# Patient Record
Sex: FEMALE | Race: BLACK OR AFRICAN AMERICAN | NOT HISPANIC OR LATINO | Employment: FULL TIME | URBAN - METROPOLITAN AREA
[De-identification: names, ages, dates, MRNs, and addresses within clinical notes are randomized per-mention and may not be internally consistent; named-entity substitution may affect disease eponyms.]

---

## 2019-06-18 ENCOUNTER — PREP FOR SURGERY (OUTPATIENT)
Dept: OTHER | Facility: HOSPITAL | Age: 69
End: 2019-06-18

## 2019-09-09 ENCOUNTER — TELEPHONE (OUTPATIENT)
Dept: CARDIOLOGY | Facility: CLINIC | Age: 69
End: 2019-09-09

## 2019-09-09 NOTE — TELEPHONE ENCOUNTER
I called and s/w pt and offered her an appointment to see Dr. Kelly on 9/11/19 at 3:00pm.    Maria Luz, can you please schedule Ms. Cooper for 9/11/19 at 3:oopm?  She will be a NP.  Thank you/lalit

## 2019-09-09 NOTE — TELEPHONE ENCOUNTER
Patient was admitted 9/6 at [facility] with near syncope and HR 40s. She was diagnosed with vertigo and is to see ENT this week. She was also told to follow up with her cardiologist but she used to se Dr Sheth who is no longer in practice. She would like to be established at [facility].     I spoke with Dr. Kelly, who told me to tell you Amber.     She has Hx of coronary stent, old CVA, smoker. We would need some old records.    Please call patient cell [phone]/  [phone] to arrange new patient appt with MI.

## 2019-09-11 ENCOUNTER — OFFICE VISIT (OUTPATIENT)
Dept: CARDIOLOGY | Facility: CLINIC | Age: 69
End: 2019-09-11

## 2019-09-11 VITALS
DIASTOLIC BLOOD PRESSURE: 78 MMHG | SYSTOLIC BLOOD PRESSURE: 100 MMHG | WEIGHT: 150 LBS | BODY MASS INDEX: 25.61 KG/M2 | HEART RATE: 61 BPM | HEIGHT: 64 IN

## 2019-09-11 DIAGNOSIS — I25.10 CORONARY ARTERY DISEASE INVOLVING NATIVE CORONARY ARTERY OF NATIVE HEART WITHOUT ANGINA PECTORIS: Primary | ICD-10-CM

## 2019-09-11 DIAGNOSIS — I10 ESSENTIAL HYPERTENSION: ICD-10-CM

## 2019-09-11 DIAGNOSIS — E78.2 MIXED HYPERLIPIDEMIA: ICD-10-CM

## 2019-09-11 DIAGNOSIS — R42 VERTIGO: ICD-10-CM

## 2019-09-11 PROCEDURE — 99204 OFFICE O/P NEW MOD 45 MIN: CPT | Performed by: INTERNAL MEDICINE

## 2019-09-11 PROCEDURE — 93000 ELECTROCARDIOGRAM COMPLETE: CPT | Performed by: INTERNAL MEDICINE

## 2019-09-11 NOTE — PROGRESS NOTES
Date of Office Visit: 19  Encounter Provider: Roc Kelly MD  Place of Service: [facility] CARDIOLOGY  Patient Name: Jenn Cooper  :1950  Referral Provider:No ref. provider found      No chief complaint on file.    History of Present Illness  Mrs Cooper is a pleasant 69 yo female with a prior history of hypertension, previous supravascular accident, kidney stones, sleep apnea, coronary artery disease.  She had anginal symptoms and in 2006 had cardiac catheterization showed 70% stenosis of the mid left anterior descending other vessels free of disease she had a 2.75 x 18 mm Cypher drug-eluting stent placed.  And in  had follow-up catheterization that was unremarkable.  And then she also had problems with syncopal near syncopal spells had an episode and at that time had a near syncopal spell but had a tilt table test in .  That was apparently positive and was on acebutolol for a short period of time.  She then started having more episodes in the past 3 months she would note him with position changes.  In particular she was getting her hair washed leaned her head back started getting dizzy lightheaded diaphoretic the room was spinning lasted 15 to 20 minutes she thought she got better had felt like she had to go to the bathroom got up to the bathroom got dizzy lightheaded again and then apparently passed out.  No paralysis or paresthesia or dysarthria.  No chest pain or pressure.  No real shortness of breath orthopnea or PND.  The other episode she is had of dizziness in the last 2 months have been very similar to those.  She did go to the emergency room twice was told she had vertigo.  Was also ever told that her heart rate was in the 40s.          Past Medical History:   Diagnosis Date   • Contraindication to deep vein thrombosis (DVT) prophylaxis    • HTN (hypertension)    • Hyperlipidemia    • Hypothyroid    • Lightheadedness    • Sinus bradycardia     • Syncope    • Vertigo          History reviewed. No pertinent surgical history.      Current Outpatient Medications on File Prior to Visit   Medication Sig Dispense Refill   • aspirin 325 MG EC tablet TAKE 1 TABLET BY MOUTH DAILY FOR 90 DOSES     • fexofenadine-pseudoephedrine (ALLEGRA-D)  MG per 12 hr tablet One a day     • fluticasone (FLONASE) 50 MCG/ACT nasal spray 1 spray into the nostril(s) as directed by provider Daily.     • levothyroxine (SYNTHROID, LEVOTHROID) 137 MCG tablet Take 137 mcg by mouth Daily.     • meclizine (ANTIVERT) 25 MG tablet Take 25 mg by mouth.     • potassium chloride (KLOR-CON) 10 MEQ CR tablet Take 10 mEq by mouth.     • simvastatin (ZOCOR) 20 MG tablet TAKE 1 TABLET BY MOUTH NIGHTLY FOR 90 DAYS     • valsartan-hydrochlorothiazide (DIOVAN-HCT) 160-25 MG per tablet TAKE 1 TABLET BY MOUTH EVERY DAY     • [DISCONTINUED] ferrous gluconate (FERGON) 324 MG tablet Take 324 mg by mouth Daily.       No current facility-administered medications on file prior to visit.          Social History     Socioeconomic History   • Marital status:      Spouse name: Not on file   • Number of children: Not on file   • Years of education: Not on file   • Highest education level: Not on file   Tobacco Use   • Smoking status: Current Every Day Smoker     Packs/day: 0.50   • Smokeless tobacco: Never Used   Substance and Sexual Activity   • Alcohol use: No     Frequency: Never       History reviewed. No pertinent family history.      Review of Systems   Constitution: Negative for decreased appetite, diaphoresis, fever, weakness, malaise/fatigue, weight gain and weight loss.   HENT: Negative for congestion, hearing loss, nosebleeds and tinnitus.    Eyes: Negative for blurred vision, double vision, vision loss in left eye, vision loss in right eye and visual disturbance.   Cardiovascular:        As noted in HPI   Respiratory:        As noted HPI   Endocrine: Negative for cold intolerance and heat  "intolerance.   Hematologic/Lymphatic: Negative for bleeding problem. Does not bruise/bleed easily.   Skin: Negative for color change, flushing, itching and rash.   Musculoskeletal: Negative for arthritis, back pain, joint pain, joint swelling, muscle weakness and myalgias.   Gastrointestinal: Negative for bloating, abdominal pain, constipation, diarrhea, dysphagia, heartburn, hematemesis, hematochezia, melena, nausea and vomiting.   Genitourinary: Negative for bladder incontinence, dysuria, frequency, nocturia and urgency.   Neurological: Negative for dizziness, focal weakness, headaches, light-headedness, loss of balance, numbness, paresthesias and vertigo.   Psychiatric/Behavioral: Negative for depression, memory loss and substance abuse.       Procedures      ECG 12 Lead  Date/Time: 9/11/2019 3:41 PM  Performed by: Roc Kelly MD  Authorized by: Roc Kelly MD   Comparison: compared with previous ECG   Similar to previous ECG  Rhythm: sinus rhythm  Rate: normal  Conduction: left anterior fascicular block  T flattening: II, III, aVF, V4, V5 and V6  QRS axis: left                  Objective:    /78 (BP Location: Right arm)   Pulse 61   Ht 162.6 cm (64\")   Wt 68 kg (150 lb)   BMI 25.75 kg/m²        Physical Exam  Physical Exam   Constitutional: She is oriented to person, place, and time. She appears well-developed and well-nourished. No distress.   HENT:   Head: Normocephalic.   Eyes: Conjunctivae are normal. Pupils are equal, round, and reactive to light. No scleral icterus.   Neck: Normal carotid pulses, no hepatojugular reflux and no JVD present. Carotid bruit is not present. No tracheal deviation, no edema and no erythema present. No thyromegaly present.   Cardiovascular: Normal rate, regular rhythm, S1 normal, S2 normal, normal heart sounds and intact distal pulses.  No extrasystoles are present. PMI is not displaced. Exam reveals no gallop, no distant heart sounds and no friction rub. "   No murmur heard.  Pulses:       Carotid pulses are 2+ on the right side, and 2+ on the left side.       Radial pulses are 2+ on the right side, and 2+ on the left side.        Femoral pulses are 2+ on the right side, and 2+ on the left side.       Dorsalis pedis pulses are 2+ on the right side, and 2+ on the left side.        Posterior tibial pulses are 2+ on the right side, and 2+ on the left side.   Pulmonary/Chest: Effort normal and breath sounds normal. No respiratory distress. She has no decreased breath sounds. She has no wheezes. She has no rhonchi. She has no rales. She exhibits no tenderness.   Abdominal: Soft. Bowel sounds are normal. She exhibits no distension and no mass. There is no hepatosplenomegaly. There is no tenderness. There is no rebound and no guarding.   Musculoskeletal: She exhibits no edema, tenderness or deformity.   Neurological: She is alert and oriented to person, place, and time.   Skin: Skin is warm and dry. No rash noted. She is not diaphoretic. No cyanosis or erythema. No pallor. Nails show no clubbing.   Psychiatric: She has a normal mood and affect. Her speech is normal and behavior is normal. Judgment and thought content normal.           Assessment:   1.  68-year-old female with coronary artery disease normal left ventricular systolic function.  Status post drug-eluting stent placement to the mid left anterior descending other vessels free of disease.    Coronary Artery Disease  Assessment  • The patient has no angina    Plan  • Lifestyle modifications discussed include adhering to a heart healthy diet, avoidance of tobacco products, maintenance of a healthy weight, medication compliance, regular exercise and regular monitoring of cholesterol and blood pressure    Subjective - Objective  • There has been a previous stent procedure using VIOLETA  • Current antiplatelet therapy includes aspirin 81 mg    But currently not having any angina.    2.  Dizziness sure sound like vertigo.   She has an appointment with ENT for crystal realignment I think that will help her the most and certainly if her symptoms resolve no further treatment or work-up.  3.  Questionable bradycardia heart rate today is 60 her symptoms sound like vertigo they do not sound cardiac again we will await her ENT treatment and evaluation if her symptoms resolve no further cardiac evaluation.  However I do think it is worthwhile for her to do an echocardiogram if her LV function is normal its unlikely there is a cardiac etiology.  4.  Hypertension as outlined above if she has vasovagal episodes or given her history of positive tilt table testing being off diuretics would help in addition she has some renal insufficiency which could be exacerbated by her diuretics discontinuing that might help that.  The best drug for her hypertension and vasovagal episodes would be clonidine.  5.  Renal insufficiency would consider stopping her diuretic.  6.  Remote cerebrovascular accident no recurrence.  7.  History of sleep apnea but not treating it she really does need to start treatment for that.         Plan:

## 2020-08-19 ENCOUNTER — TELEPHONE (OUTPATIENT)
Dept: CARDIOLOGY | Facility: CLINIC | Age: 70
End: 2020-08-19

## 2020-08-19 NOTE — TELEPHONE ENCOUNTER
Patients daughter called to report patient is admitted at [facility] for passing out while at work. She had no pulse and CPR was started.  She apparently has had another syncopal spell and it has been recommended that she have an implantable loop inserted. She prefers for our office to insert and follow if agreed. The plan is that she is discharged tomorrow. I have agreed to see her this Friday at 3p.  Patient and daughter aware. Please add her to my schedule.

## 2020-08-21 ENCOUNTER — OFFICE VISIT (OUTPATIENT)
Dept: CARDIOLOGY | Facility: CLINIC | Age: 70
End: 2020-08-21

## 2020-08-21 VITALS
WEIGHT: 152 LBS | DIASTOLIC BLOOD PRESSURE: 60 MMHG | SYSTOLIC BLOOD PRESSURE: 142 MMHG | HEIGHT: 64 IN | BODY MASS INDEX: 25.95 KG/M2 | HEART RATE: 59 BPM

## 2020-08-21 DIAGNOSIS — R55 RECURRENT SYNCOPE: ICD-10-CM

## 2020-08-21 DIAGNOSIS — F17.200 TOBACCO DEPENDENCE: ICD-10-CM

## 2020-08-21 DIAGNOSIS — I25.10 CORONARY ARTERY DISEASE INVOLVING NATIVE CORONARY ARTERY OF NATIVE HEART WITHOUT ANGINA PECTORIS: Primary | ICD-10-CM

## 2020-08-21 DIAGNOSIS — I10 ESSENTIAL HYPERTENSION: ICD-10-CM

## 2020-08-21 DIAGNOSIS — E78.2 MIXED HYPERLIPIDEMIA: ICD-10-CM

## 2020-08-21 PROBLEM — I63.81 LACUNAR INFARCTION: Status: ACTIVE | Noted: 2020-08-21

## 2020-08-21 PROCEDURE — 99406 BEHAV CHNG SMOKING 3-10 MIN: CPT | Performed by: NURSE PRACTITIONER

## 2020-08-21 PROCEDURE — 99214 OFFICE O/P EST MOD 30 MIN: CPT | Performed by: NURSE PRACTITIONER

## 2020-08-21 PROCEDURE — 93000 ELECTROCARDIOGRAM COMPLETE: CPT | Performed by: NURSE PRACTITIONER

## 2020-08-21 RX ORDER — DOXEPIN HYDROCHLORIDE 25 MG/1
25 CAPSULE ORAL NIGHTLY PRN
COMMUNITY

## 2020-08-21 RX ORDER — NICOTINE 21 MG/24HR
1 PATCH, TRANSDERMAL 24 HOURS TRANSDERMAL EVERY 24 HOURS
Qty: 28 EACH | Refills: 1 | Status: SHIPPED | OUTPATIENT
Start: 2020-08-21 | End: 2021-10-08 | Stop reason: SDDI

## 2020-08-21 RX ORDER — POTASSIUM CHLORIDE 750 MG/1
10 TABLET, FILM COATED, EXTENDED RELEASE ORAL 2 TIMES DAILY
COMMUNITY

## 2020-08-21 RX ORDER — ERGOCALCIFEROL 1.25 MG/1
50000 CAPSULE ORAL
COMMUNITY
End: 2022-11-18

## 2020-08-21 NOTE — PROGRESS NOTES
Date of Office Visit: 20  Encounter Provider: LILY Mace  Place of Service: [facility] CARDIOLOGY  Patient Name: Jenn Cooper  :1950    Chief Complaint   Patient presents with   • Loss of Consciousness   • Coronary Artery Disease   • Follow-up   :     HPI: Jenn Cooper is a 69 y.o. female  with history of coronary artery disease, hypertension, hyperlipidemia, hypothyroidism, recurrent syncope, cerebrovascular accident, renal insufficiency, obstructive sleep apnea vertigo, and tobacco use.    She is followed by Dr. Roc Kelly and I will visit with her in follow-up today.     She had anginal symptoms and in 2006 had cardiac catheterization showed 70% stenosis of the mid left anterior descending other vessels free of disease she had a 2.75 x 18 mm Cypher drug-eluting stent placed.  And in  had follow-up catheterization that was unremarkable.  And then she also had problems with syncopal near syncopal spells had an episode and at that time had a near syncopal spell but had a tilt table test in .  That was apparently positive and was on acebutolol for a short period of time.    At some point her heart rate was captured in the 40s.  When she was seen in 2019 she denied angina.  She was to have an ENT evaluation for possible vertigo.  Her heart rate was in the 60s at that time.  Echocardiogram at have a heart clinic showed normal left ventricular systolic function, mild diastolic dysfunction, aortic valve calcification and moderate tricuspid insufficiency.    She now presents for hospital discharge follow-up.  She was evaluated at [facility] due to a recurrent syncopal spell which occurred this Tuesday morning.  She was at work where she works as a .  She bent over to pick something up and had lightheadedness.  She felt near syncopal went to sit down for a couple minutes.  She then got up and went to walk again and felt  "weak, near syncopal, slightly diaphoretic, no chest pain, no shortness of breath, and then was lowered to the floor by a coworker.  She reports waking up with people around her and that she did not remember anything else.  There was no spinning of her surroundings.  She had an EKG, EEG which was normal, negative perfusion stress test and wore 24-hour Holter which is currently pending.  She dropped that off before visit today.  She denies edema, chest pain tightness pressure, palpitation.  She states she has not had a significant episode like Tuesday since last year when she was admitted.  She stays active but does not perform any structured exercise.  She continues to smoke 1/2 pack of cigarettes daily but has cut back.  She has been wearing a nicotine patch since discharge but did not receive a prescription for that.  She expresses a desire to quit and does believe the patches are helping.  She reports blood pressures usually better than it is today.  She has been having issues with increased stress and difficulty sleeping.  Her PCP prescribed doxepin.  Allergies   Allergen Reactions   • Iodinated Diagnostic Agents Itching and Swelling       Past Medical History:   Diagnosis Date   • Contraindication to deep vein thrombosis (DVT) prophylaxis    • HTN (hypertension)    • Hyperlipidemia    • Hypothyroid    • Lightheadedness    • Sinus bradycardia    • Syncope    • Vertigo        History reviewed. No pertinent surgical history.      Family and social history reviewed.     Review of Systems   Constitution: Positive for malaise/fatigue.   Cardiovascular: Positive for dyspnea on exertion.   Musculoskeletal: Positive for joint pain.   Neurological: Positive for light-headedness.     All other systems were reviewed and are negative          Objective:     Vitals:    08/21/20 1514   BP: 142/60   BP Location: Left arm   Patient Position: Sitting   Pulse: 59   Weight: 68.9 kg (152 lb)   Height: 162.6 cm (64\")     Body mass " index is 26.09 kg/m².    PHYSICAL EXAM:  Physical Exam   Constitutional: She is oriented to person, place, and time. She appears well-developed and well-nourished. No distress.   HENT:   Head: Normocephalic.   Eyes: Conjunctivae are normal.   Neck: Normal range of motion. No JVD present.   Cardiovascular: Normal rate, regular rhythm, normal heart sounds and intact distal pulses.   No murmur heard.  Pulses:       Carotid pulses are 2+ on the right side, and 2+ on the left side.       Radial pulses are 2+ on the right side, and 2+ on the left side.        Posterior tibial pulses are 2+ on the right side, and 2+ on the left side.   Pulmonary/Chest: Effort normal and breath sounds normal. No respiratory distress. She has no wheezes. She has no rhonchi. She has no rales. She exhibits no tenderness.   Abdominal: Soft. Bowel sounds are normal. She exhibits no distension.   Musculoskeletal: Normal range of motion. She exhibits no edema.   Neurological: She is alert and oriented to person, place, and time.   Skin: Skin is warm, dry and intact. No rash noted. She is not diaphoretic. No cyanosis.   Psychiatric: She has a normal mood and affect. Her behavior is normal. Judgment and thought content normal.         ECG 12 Lead  Date/Time: 8/21/2020 3:31 PM  Performed by: Madhavi Le APRN  Authorized by: Madahvi Le APRN   Comparison: compared with previous ECG   Similar to previous ECG  Rhythm: sinus rhythm  Rate: normal  T inversion: II, III and aVF  T flattening: V6  QRS axis: normal    Clinical impression: abnormal EKG            Current Outpatient Medications   Medication Sig Dispense Refill   • aspirin 325 MG EC tablet TAKE 1 TABLET BY MOUTH DAILY FOR 90 DOSES     • doxepin (SINEquan) 25 MG capsule Take 25 mg by mouth Every Night.     • fexofenadine-pseudoephedrine (ALLEGRA-D)  MG per 12 hr tablet One a day     • fluticasone (FLONASE) 50 MCG/ACT nasal spray 1 spray into the nostril(s) as directed by provider  Daily.     • levothyroxine (SYNTHROID, LEVOTHROID) 137 MCG tablet Take 137 mcg by mouth Daily.     • meclizine (ANTIVERT) 25 MG tablet Take 25 mg by mouth.     • potassium chloride (K-DUR) 10 MEQ CR tablet Take 10 mEq by mouth 2 (Two) Times a Day.     • simvastatin (ZOCOR) 20 MG tablet TAKE 1 TABLET BY MOUTH NIGHTLY FOR 90 DAYS     • valsartan-hydrochlorothiazide (DIOVAN-HCT) 160-25 MG per tablet TAKE 1 TABLET BY MOUTH EVERY DAY     • vitamin D (ERGOCALCIFEROL) 1.25 MG (13778 UT) capsule capsule Take 50,000 Units by mouth Every 7 (Seven) Days.     • nicotine (NICODERM CQ) 21 MG/24HR patch Place 1 patch on the skin as directed by provider Daily. 28 each 1     No current facility-administered medications for this visit.      Assessment:       Diagnosis Plan   1. Coronary artery disease involving native coronary artery of native heart without angina pectoris  ECG 12 Lead   2. Essential hypertension     3. Mixed hyperlipidemia     4. Tobacco dependence     5. Recurrent syncope  Case Request Cath Lab: Loop insertion        Orders Placed This Encounter   Procedures   • ECG 12 Lead     This order was created via procedure documentation         Plan:       1.  69-year-old pleasant -American female with coronary artery disease, normal left ventricular systolic function.  Status post drug-eluting stent placement to the mid left anterior descending other vessels free of disease in 2006.    She had normal LV systolic function September 2019 on echo.  She had a perfusion stress test at [facility] just this month which was negative for ischemia.  2.  Hypertension.she is maintained on Diovan/HCTZ per her PCP.  The best drug for her hypertension and vasovagal episodes would be clonidine.   3.  Hyperlipidemia on simvastatin 20 mg LDL target 70 or less and she was at goal last year  4.  Tobacco use-she is on nicotine patch 14 mg per 24 hours from [facility].  She did not receive a prescription from this and due to  her desire to quit she has requested another prescription.  I am going to give her 21 mg patches and follow-up with her in 1 month on her status.  Discussed smoking sensation for 5 minutes.  5.  Hypothyroidism on replacement therapy  6.  Recurrent syncope. History of positive tilt table test.  Felt to have some vasovagal component discussed compression stockings and sitting or lying down when these occur.  24-hour Holter monitor is pending from [place].  It was recommended that she have a loop recorder implanted which I agree and patient is agreeable.  Case request placed.  We will follow the device outpatient   7.  History of vertigo as needed meclizine follows with ENT  8.  Remote cerebral vascular accident and old lacunar infarct  9.  Obstructive sleep apnea she is not treating  10.  History of bradycardia- await 24 H holter and follow loop recorder  11. Renal insufficiency-GFR has been 45-55. her diuretic could be exacerbating this  12. Anemia-vitamin B12 was low 2 days ago at 197.  Her folate and iron profile was normal at that time.  She is to follow-up with her PCP regarding this              It has been a pleasure to participate in this patient's care.      Thank you,  LILY Mace      **I used Dragon to dictate this note:**

## 2020-08-23 PROBLEM — R55 RECURRENT SYNCOPE: Status: ACTIVE | Noted: 2020-08-23

## 2020-08-24 ENCOUNTER — TRANSCRIBE ORDERS (OUTPATIENT)
Dept: SURGERY | Facility: CLINIC | Age: 70
End: 2020-08-24

## 2020-08-24 ENCOUNTER — TELEPHONE (OUTPATIENT)
Dept: CARDIOLOGY | Facility: CLINIC | Age: 70
End: 2020-08-24

## 2020-08-24 DIAGNOSIS — Z01.818 OTHER SPECIFIED PRE-OPERATIVE EXAMINATION: Primary | ICD-10-CM

## 2020-08-24 NOTE — TELEPHONE ENCOUNTER
Faxed Madhavi's Office note dated 8/21/20 to LILY Sahu.   Fax # [phone].  Faxed confirmation received. / TROY

## 2020-08-26 ENCOUNTER — LAB (OUTPATIENT)
Dept: LAB | Facility: HOSPITAL | Age: 70
End: 2020-08-26

## 2020-08-26 DIAGNOSIS — Z01.818 OTHER SPECIFIED PRE-OPERATIVE EXAMINATION: ICD-10-CM

## 2020-08-26 PROCEDURE — U0004 COV-19 TEST NON-CDC HGH THRU: HCPCS

## 2020-08-26 PROCEDURE — C9803 HOPD COVID-19 SPEC COLLECT: HCPCS

## 2020-08-26 PROCEDURE — U0002 COVID-19 LAB TEST NON-CDC: HCPCS

## 2020-08-27 LAB
REF LAB TEST METHOD: NORMAL
SARS-COV-2 RNA RESP QL NAA+PROBE: NOT DETECTED

## 2020-08-28 ENCOUNTER — HOSPITAL ENCOUNTER (OUTPATIENT)
Facility: HOSPITAL | Age: 70
Setting detail: HOSPITAL OUTPATIENT SURGERY
Discharge: HOME | End: 2020-08-28
Attending: INTERNAL MEDICINE | Admitting: INTERNAL MEDICINE

## 2020-08-28 VITALS
HEART RATE: 56 BPM | TEMPERATURE: 96.7 F | BODY MASS INDEX: 25.61 KG/M2 | HEIGHT: 64 IN | DIASTOLIC BLOOD PRESSURE: 76 MMHG | WEIGHT: 150 LBS | SYSTOLIC BLOOD PRESSURE: 149 MMHG | RESPIRATION RATE: 16 BRPM | OXYGEN SATURATION: 98 %

## 2020-08-28 DIAGNOSIS — R55 RECURRENT SYNCOPE: ICD-10-CM

## 2020-08-28 PROCEDURE — 33285 INSJ SUBQ CAR RHYTHM MNTR: CPT | Performed by: INTERNAL MEDICINE

## 2020-08-28 PROCEDURE — 25010000002 MIDAZOLAM PER 1 MG: Performed by: INTERNAL MEDICINE

## 2020-08-28 PROCEDURE — 25010000002 FENTANYL CITRATE (PF) 100 MCG/2ML SOLUTION: Performed by: INTERNAL MEDICINE

## 2020-08-28 PROCEDURE — C1764 EVENT RECORDER, CARDIAC: HCPCS | Performed by: INTERNAL MEDICINE

## 2020-08-28 DEVICE — ICM LP/RECRD REVEAL LINQ MEDTRONIC: Type: IMPLANTABLE DEVICE | Status: FUNCTIONAL

## 2020-08-28 RX ORDER — MIDAZOLAM HYDROCHLORIDE 1 MG/ML
INJECTION INTRAMUSCULAR; INTRAVENOUS AS NEEDED
Status: DISCONTINUED | OUTPATIENT
Start: 2020-08-28 | End: 2020-08-28 | Stop reason: HOSPADM

## 2020-08-28 RX ORDER — SODIUM CHLORIDE 0.9 % (FLUSH) 0.9 %
3 SYRINGE (ML) INJECTION EVERY 12 HOURS SCHEDULED
Status: DISCONTINUED | OUTPATIENT
Start: 2020-08-28 | End: 2020-08-28 | Stop reason: HOSPADM

## 2020-08-28 RX ORDER — SODIUM CHLORIDE 9 MG/ML
75 INJECTION, SOLUTION INTRAVENOUS CONTINUOUS
Status: DISCONTINUED | OUTPATIENT
Start: 2020-08-28 | End: 2020-08-28 | Stop reason: HOSPADM

## 2020-08-28 RX ORDER — FENTANYL CITRATE 50 UG/ML
INJECTION, SOLUTION INTRAMUSCULAR; INTRAVENOUS AS NEEDED
Status: DISCONTINUED | OUTPATIENT
Start: 2020-08-28 | End: 2020-08-28 | Stop reason: HOSPADM

## 2020-08-28 RX ORDER — SODIUM CHLORIDE 0.9 % (FLUSH) 0.9 %
10 SYRINGE (ML) INJECTION AS NEEDED
Status: DISCONTINUED | OUTPATIENT
Start: 2020-08-28 | End: 2020-08-28 | Stop reason: HOSPADM

## 2020-08-28 RX ORDER — LIDOCAINE HYDROCHLORIDE AND EPINEPHRINE 10; 10 MG/ML; UG/ML
INJECTION, SOLUTION INFILTRATION; PERINEURAL AS NEEDED
Status: DISCONTINUED | OUTPATIENT
Start: 2020-08-28 | End: 2020-08-28 | Stop reason: HOSPADM

## 2020-08-28 RX ADMIN — SODIUM CHLORIDE 75 ML/HR: 9 INJECTION, SOLUTION INTRAVENOUS at 08:20

## 2020-09-04 ENCOUNTER — OFFICE VISIT (OUTPATIENT)
Dept: CARDIOLOGY | Facility: CLINIC | Age: 70
End: 2020-09-04

## 2020-09-04 ENCOUNTER — TELEPHONE (OUTPATIENT)
Dept: CARDIOLOGY | Facility: CLINIC | Age: 70
End: 2020-09-04

## 2020-09-04 ENCOUNTER — CLINICAL SUPPORT NO REQUIREMENTS (OUTPATIENT)
Dept: CARDIOLOGY | Facility: CLINIC | Age: 70
End: 2020-09-04

## 2020-09-04 VITALS
WEIGHT: 154 LBS | HEART RATE: 57 BPM | SYSTOLIC BLOOD PRESSURE: 136 MMHG | BODY MASS INDEX: 26.29 KG/M2 | HEIGHT: 64 IN | DIASTOLIC BLOOD PRESSURE: 66 MMHG

## 2020-09-04 DIAGNOSIS — F17.200 TOBACCO DEPENDENCE: ICD-10-CM

## 2020-09-04 DIAGNOSIS — I25.10 CORONARY ARTERY DISEASE INVOLVING NATIVE CORONARY ARTERY OF NATIVE HEART WITHOUT ANGINA PECTORIS: ICD-10-CM

## 2020-09-04 DIAGNOSIS — R55 SYNCOPE AND COLLAPSE: Primary | ICD-10-CM

## 2020-09-04 DIAGNOSIS — I49.3 PVC (PREMATURE VENTRICULAR CONTRACTION): ICD-10-CM

## 2020-09-04 DIAGNOSIS — I45.5 SINUS PAUSE: ICD-10-CM

## 2020-09-04 DIAGNOSIS — R55 RECURRENT SYNCOPE: Primary | ICD-10-CM

## 2020-09-04 DIAGNOSIS — Z95.818 STATUS POST PLACEMENT OF IMPLANTABLE LOOP RECORDER: ICD-10-CM

## 2020-09-04 PROCEDURE — 99406 BEHAV CHNG SMOKING 3-10 MIN: CPT | Performed by: NURSE PRACTITIONER

## 2020-09-04 PROCEDURE — 99214 OFFICE O/P EST MOD 30 MIN: CPT | Performed by: NURSE PRACTITIONER

## 2020-09-04 RX ORDER — LEVOTHYROXINE SODIUM 0.12 MG/1
125 TABLET ORAL DAILY
COMMUNITY

## 2020-09-04 NOTE — PROGRESS NOTES
Date of Office Visit: 20  Encounter Provider: LILY Mace  Place of Service: [facility] CARDIOLOGY  Patient Name: Jenn Cooper  :1950    Chief Complaint   Patient presents with   • Coronary artery disease involving native coronary artery of    • Follow-up   :     HPI: Jenn Cooper is a 69 y.o. female  with history of coronary artery disease, hypertension, hyperlipidemia, hypothyroidism, recurrent syncope, cerebrovascular accident, renal insufficiency, obstructive sleep apnea vertigo, and tobacco use.     She is followed by Dr. Roc Kelly and I will visit with her in follow-up today.      She had anginal symptoms and in 2006 had cardiac catheterization showed 70% stenosis of the mid left anterior descending other vessels free of disease she had a 2.75 x 18 mm Cypher drug-eluting stent placed.  And in  had follow-up catheterization that was unremarkable.  And then she also had problems with syncopal near syncopal spells had an episode and at that time had a near syncopal spell but had a tilt table test in .  That was apparently positive and was on acebutolol for a short period of time.    At some point her heart rate was captured in the 40s.  When she was seen in 2019 she denied angina.  She was to have an ENT evaluation for possible vertigo.  Her heart rate was in the 60s at that time.  Echocardiogram at have a heart clinic showed normal left ventricular systolic function, mild diastolic dysfunction, aortic valve calcification and moderate tricuspid insufficiency.  In 2020 she was evaluated at [facility] due to a recurrent syncopal spell which occurred this Tuesday morning.  She was at work where she works as a .  She bent over to pick something up and had lightheadedness.  She felt near syncopal went to sit down for a couple minutes.  She then got up and went to walk again and felt weak, near syncopal,  "slightly diaphoretic, no chest pain, no shortness of breath, and then was lowered to the floor by a coworker.  She reports waking up with people around her and that she did not remember anything else.  There was no spinning of her surroundings.  She had an EKG, EEG which was normal, negative perfusion stress test and wore 24-hour Holter which showed an 8-second atrial run, P AC, PVC, and 1 sinus pause which lasted 1.5 seconds.  She ultimately had an implantable loop recorder implanted 8/28/2020 for recurrent syncope.  [facility].  She now presents for hospital discharge follow-up.  She has a little tenderness in the left chest from the implant.  Otherwise she continues to have episodes of lightheadedness and feeling dizzy.  She has had no overt syncope since the above.  She has no chest pain tightness pressure, palpitation, edema.  She continues to smoke half pack of cigarettes daily her insurance company would not cover nicotine patches so she plans to purchase some over-the-counter.              Allergies   Allergen Reactions   • Iodinated Diagnostic Agents Itching and Swelling       Past Medical History:   Diagnosis Date   • Contraindication to deep vein thrombosis (DVT) prophylaxis    • HTN (hypertension)    • Hyperlipidemia    • Hypothyroid    • Lightheadedness    • Sinus bradycardia    • Sinus pause    • Syncope    • Vertigo        Past Surgical History:   Procedure Laterality Date   • CARDIAC ELECTROPHYSIOLOGY PROCEDURE N/A 8/28/2020    Procedure: Loop insertion;  Surgeon: Carl Calderon MD;  Location: [facility] INVASIVE LOCATION;  Service: Cardiovascular;  Laterality: N/A;         Family and social history reviewed.     ROS  All other systems were reviewed and are negative          Objective:     Vitals:    09/04/20 1421   BP: 136/66   BP Location: Left arm   Patient Position: Sitting   Pulse: 57   Weight: 69.9 kg (154 lb)   Height: 162.6 cm (64\")     Body mass index is 26.43 " kg/m².    PHYSICAL EXAM:  Physical Exam   Constitutional: She is oriented to person, place, and time. She appears well-developed and well-nourished. No distress.   HENT:   Head: Normocephalic.   Eyes: Conjunctivae are normal.   Neck: Normal range of motion. No JVD present.   Cardiovascular: Normal rate, regular rhythm, normal heart sounds and intact distal pulses.   No murmur heard.  Pulses:       Carotid pulses are 2+ on the right side, and 2+ on the left side.       Radial pulses are 2+ on the right side, and 2+ on the left side.        Posterior tibial pulses are 2+ on the right side, and 2+ on the left side.   Pulmonary/Chest: Effort normal and breath sounds normal. No respiratory distress. She has no wheezes. She has no rhonchi. She has no rales. She exhibits no tenderness.   Abdominal: Soft. Bowel sounds are normal. She exhibits no distension.   Musculoskeletal: Normal range of motion. She exhibits no edema.   Neurological: She is alert and oriented to person, place, and time.   Skin: Skin is warm, dry and intact. No rash noted. She is not diaphoretic. No cyanosis.   Psychiatric: She has a normal mood and affect. Her behavior is normal. Judgment and thought content normal.       Procedures    Current Outpatient Medications   Medication Sig Dispense Refill   • aspirin 325 MG EC tablet TAKE 1 TABLET BY MOUTH DAILY FOR 90 DOSES     • doxepin (SINEquan) 25 MG capsule Take 25 mg by mouth At Night As Needed.     • fexofenadine-pseudoephedrine (ALLEGRA-D)  MG per 12 hr tablet Take as instructed by your provider     • fluticasone (FLONASE) 50 MCG/ACT nasal spray 1 spray into the nostril(s) as directed by provider Daily.     • levothyroxine (SYNTHROID, LEVOTHROID) 125 MCG tablet Take 125 mcg by mouth Daily.     • potassium chloride (K-DUR) 10 MEQ CR tablet Take 10 mEq by mouth 2 (Two) Times a Day.     • simvastatin (ZOCOR) 20 MG tablet TAKE 1 TABLET BY MOUTH NIGHTLY FOR 90 DAYS     • valsartan-hydrochlorothiazide  (DIOVAN-HCT) 160-25 MG per tablet TAKE 1 TABLET BY MOUTH EVERY DAY     • vitamin D (ERGOCALCIFEROL) 1.25 MG (61676 UT) capsule capsule Take 50,000 Units by mouth Every 7 (Seven) Days.     • meclizine (ANTIVERT) 25 MG tablet Take 25 mg by mouth 3 (Three) Times a Day As Needed.     • nicotine (NICODERM CQ) 21 MG/24HR patch Place 1 patch on the skin as directed by provider Daily. 28 each 1     No current facility-administered medications for this visit.      Assessment:       Diagnosis Plan   1. Recurrent syncope     2. Sinus pause     3. PVC (premature ventricular contraction)     4. Coronary artery disease involving native coronary artery of native heart without angina pectoris     5. Tobacco dependence     6. Status post placement of implantable loop recorder          No orders of the defined types were placed in this encounter.        Plan:       1.  69-year-old pleasant -American female with coronary artery disease, normal left ventricular systolic function.  Status post drug-eluting stent placement to the mid left anterior descending other vessels free of disease in 2006.    She had normal LV systolic function September 2019 on echo.  She had a perfusion stress test at [facility] just last month which was negative for ischemia.  2.  Hypertension.she is maintained on Diovan/HCTZ per her PCP.  The best drug for her hypertension and vasovagal episodes would be clonidine.   3.  Hyperlipidemia on simvastatin 20 mg LDL target 70 or less and she was at goal last year  4.  Tobacco use-she has desire to quit her insurance would not cover the cost of the prescription she plans to purchase nicotine patches over-the-counter she currently smokes half pack of cigarettes daily.  Discussed smoking cessation for 3 minutes  5.  Hypothyroidism on replacement therapy  6.  Recurrent syncope. History of positive tilt table test.  Felt to have some vasovagal component discussed compression stockings and sitting or lying  down when these occur.  24-hour Holter from [place] showed a 1.5-second pause otherwise unremarkable.  She is now status post implantable loop recorder which we will follow  7.  History of vertigo as needed meclizine follows with ENT  8.  Remote cerebral vascular accident and old lacunar infarct  9.  Obstructive sleep apnea she is not treating  10.  History of bradycardia- await 24 H holter and follow loop recorder  11. Renal insufficiency-GFR has been 45-55. her diuretic could be exacerbating this  12. Anemia-vitamin B12 was low last month she received an injection by her PCP today.  And apparently had other blood work  13.  Overdue for colonoscopy she is to contact her old gastroenterologist Dr. Murphy at [place].  She is to call me if she would like me to arrange her with [facility] gastroenterology.  She does have family history of colon cancer in her aunt    Follow-up with me in 1 year.  She plans to still follow along with Dr. Kelly to have a heart clinic             It has been a pleasure to participate in this patient's care.      Thank you,  LILY Mace      **I used Dragon to dictate this note:**

## 2020-09-04 NOTE — TELEPHONE ENCOUNTER
ILR implanted for syncope.  Detections were not on.  I set them to   Pause 3 sec  Rnoi 30bpm for 4 sec  Tachy 16 beats 162bpm  AF only >6 min    If you want it otherwise just let me know.  I did talk to Camime and she said she was aware and has been talking to rep about possible causes.

## 2020-09-21 ENCOUNTER — TELEPHONE (OUTPATIENT)
Dept: CARDIOLOGY | Facility: CLINIC | Age: 70
End: 2020-09-21

## 2020-09-21 NOTE — TELEPHONE ENCOUNTER
Patient called back to report she is still using the patch and is smoking about 3.5 cigarettes daily.  She can be reached at [phone]./ TROY

## 2020-09-21 NOTE — TELEPHONE ENCOUNTER
Spoke with patient. She has been on 21 mg nicotine patch for 1.5 weeks. She is off work now per vertigo exacerbation. She has a follow up soon with that provider. She is scheduled for colonoscopy in one month. I will call her again in 3 weeks to reassess how much she is smoking and advised that she decrease the patch dose down to 14 mg/ day.    She verbalized understanding

## 2020-09-21 NOTE — TELEPHONE ENCOUNTER
----- Message from LILY Mace sent at 8/21/2020  4:18 PM EDT -----  Called and LVM with patient today to follow up on smoking quantity while on nicotine patch 21 mg/24 h. I requested a returned call.        AL     Anesthesia ROS/Med Hx    Overall Review:    Pt. has no history of anesthetic complications    Pulmonary Review:  Pulmonary Comments: Pleural effusions  Pt. positive for pneumonia  Pt. negative for sleep apnea   Pt. negative for asthma  The patient is a current smoker.     Neuro/Psych Review:    Pt. negative for seizures  Pt. negative for CVA    Cardiovascular Review:  Cardiovascular ROS notes: Elevated troponin - likely demand ischemia per Cardiology  Exercise tolerance: good  Pt. positive for past MI  Pt. negative for angina  Pt. negative for TIAN  Pt. positive for hypertension  Pt. positive for dysrhythmias (h/o a-fib)    GI/HEPATIC/RENAL Review:    Pt. negative for GERD  Pt. positive for renal disease - ARF and CRI    End/Other Review:    Pt. positive for diabetes - type 1 - well controlled - using insulin  Pt. positive for hypothyroidism  Overall Review of Systems Comments:  Abdominal Pain; N/V of coffee ground emesis  Last attempted liquids this AM, solids yesterday  Ascites      Anesthesia Plan     ASA Status: 4  Anesthesia Type: MAC  Induction: Intravenous  Reviewed: Lab Results, Nursing Notes, Beta Blocker Status, Medications, Consultations, Problem List, Pre-Induction Reassessment, NPO Status, Past Med History, Patient Summary and Allergies  The proposed anesthetic plan, including its risks and benefits, have been discussed with the Patient - along with the risks and benefits of alternatives.  Questions were encouraged and answered and the patient and/or representative agrees to proceed.  Informed Consent for Blood: Consented  Plan Comments: Discussed proceeding with MAC based resolution of her emesis.  Discussed possibility of conversion to GETA if necessary      Physical Exam  Mallampati: II  TM Distance: >3 FB  Neck ROM: Full  Cardio Rhythm: Regular  Cardio Rate: Normal  Patient Demonstrates:  Decreased Breath Sounds  abdominal exam normal  dental exam normal

## 2020-10-14 ENCOUNTER — TELEPHONE (OUTPATIENT)
Dept: CARDIOLOGY | Facility: CLINIC | Age: 70
End: 2020-10-14

## 2020-10-14 NOTE — TELEPHONE ENCOUNTER
----- Message from LILY Mace sent at 9/21/2020  1:28 PM EDT -----  Called patient she is still smoking 3.5 cigarettes daily. Wearing 21mg/day patch. She is not ready to decrease nicotine patch down to 14 mg/day.    Vertigo no significant  Improvement. She follows back up with managing physician at the end of the month.  Colonoscopy next week.  She has been stressed and that is why she is not changed the amount of cigarettes she is smoking.  I told her I will check back with her in another month.    AL

## 2021-03-03 PROCEDURE — 93298 REM INTERROG DEV EVAL SCRMS: CPT | Performed by: INTERNAL MEDICINE

## 2021-03-03 PROCEDURE — G2066 INTER DEVC REMOTE 30D: HCPCS | Performed by: INTERNAL MEDICINE

## 2021-05-06 ENCOUNTER — IMMUNIZATION (OUTPATIENT)
Dept: VACCINE CLINIC | Facility: HOSPITAL | Age: 71
End: 2021-05-06

## 2021-05-06 PROCEDURE — 0001A: CPT | Performed by: INTERNAL MEDICINE

## 2021-05-06 PROCEDURE — 91300 HC SARSCOV02 VAC 30MCG/0.3ML IM: CPT | Performed by: INTERNAL MEDICINE

## 2021-05-27 ENCOUNTER — IMMUNIZATION (OUTPATIENT)
Dept: VACCINE CLINIC | Facility: HOSPITAL | Age: 71
End: 2021-05-27

## 2021-05-27 PROCEDURE — 0002A: CPT | Performed by: INTERNAL MEDICINE

## 2021-05-27 PROCEDURE — 91300 HC SARSCOV02 VAC 30MCG/0.3ML IM: CPT | Performed by: INTERNAL MEDICINE

## 2021-06-04 PROCEDURE — G2066 INTER DEVC REMOTE 30D: HCPCS | Performed by: INTERNAL MEDICINE

## 2021-06-04 PROCEDURE — 93298 REM INTERROG DEV EVAL SCRMS: CPT | Performed by: INTERNAL MEDICINE

## 2021-07-05 PROCEDURE — G2066 INTER DEVC REMOTE 30D: HCPCS | Performed by: INTERNAL MEDICINE

## 2021-07-05 PROCEDURE — 93298 REM INTERROG DEV EVAL SCRMS: CPT | Performed by: INTERNAL MEDICINE

## 2021-08-05 PROCEDURE — G2066 INTER DEVC REMOTE 30D: HCPCS | Performed by: INTERNAL MEDICINE

## 2021-08-05 PROCEDURE — 93298 REM INTERROG DEV EVAL SCRMS: CPT | Performed by: INTERNAL MEDICINE

## 2021-09-05 PROCEDURE — G2066 INTER DEVC REMOTE 30D: HCPCS | Performed by: INTERNAL MEDICINE

## 2021-09-05 PROCEDURE — 93298 REM INTERROG DEV EVAL SCRMS: CPT | Performed by: INTERNAL MEDICINE

## 2021-10-08 ENCOUNTER — OFFICE VISIT (OUTPATIENT)
Dept: CARDIOLOGY | Facility: CLINIC | Age: 71
End: 2021-10-08

## 2021-10-08 VITALS
HEART RATE: 72 BPM | WEIGHT: 157 LBS | BODY MASS INDEX: 26.8 KG/M2 | HEIGHT: 64 IN | SYSTOLIC BLOOD PRESSURE: 104 MMHG | DIASTOLIC BLOOD PRESSURE: 66 MMHG

## 2021-10-08 DIAGNOSIS — I10 ESSENTIAL HYPERTENSION: ICD-10-CM

## 2021-10-08 DIAGNOSIS — I49.3 PVC (PREMATURE VENTRICULAR CONTRACTION): ICD-10-CM

## 2021-10-08 DIAGNOSIS — I25.10 CORONARY ARTERY DISEASE INVOLVING NATIVE CORONARY ARTERY OF NATIVE HEART WITHOUT ANGINA PECTORIS: Primary | ICD-10-CM

## 2021-10-08 DIAGNOSIS — F17.200 TOBACCO DEPENDENCE: ICD-10-CM

## 2021-10-08 DIAGNOSIS — E78.2 MIXED HYPERLIPIDEMIA: ICD-10-CM

## 2021-10-08 DIAGNOSIS — I65.23 CAROTID STENOSIS, BILATERAL: ICD-10-CM

## 2021-10-08 PROCEDURE — 93000 ELECTROCARDIOGRAM COMPLETE: CPT | Performed by: NURSE PRACTITIONER

## 2021-10-08 PROCEDURE — 99214 OFFICE O/P EST MOD 30 MIN: CPT | Performed by: NURSE PRACTITIONER

## 2021-10-08 NOTE — PROGRESS NOTES
Date of Office Visit: 10/08/21  Encounter Provider: LILY Mace  Place of Service: [facility] CARDIOLOGY  Patient Name: Jenn Cooper  :1950    Chief Complaint   Patient presents with   • Coronary artery disease involving native coronary artery of    • Recurrent syncope   • Follow-up   :     HPI: Jenn Cooper is a 70 y.o. female  with history of coronary artery disease, hypertension, hyperlipidemia, hypothyroidism, carotid stenosis, recurrent syncope, cerebrovascular accident, renal insufficiency, obstructive sleep apnea, vertigo, and tobacco use.     She was followed by Dr. Roc Kelly and I will visit with her in follow-up today.  This is a close friend to my family.      She had anginal symptoms and in 2006 had cardiac catheterization showed 70% stenosis of the mid left anterior descending other vessels free of disease she had a 2.75 x 18 mm Cypher drug-eluting stent placed.  And in  had follow-up catheterization that was unremarkable.  And then she also had problems with syncopal near syncopal spells had an episode and at that time had a near syncopal spell but had a tilt table test in .  That was apparently positive and was on acebutolol for a short period of time.    At some point her heart rate was captured in the 40s.  When she was seen in 2019 she denied angina.  She was to have an ENT evaluation for possible vertigo.  Her heart rate was in the 60s at that time.  Echocardiogram at have a heart clinic showed normal left ventricular systolic function, mild diastolic dysfunction, aortic valve calcification and moderate tricuspid insufficiency.  In 2020 she was evaluated at [facility] due to a recurrent syncopal spell which occurred this Tuesday morning.  She was at work where she works as a .  She bent over to pick something up and had lightheadedness.  She felt near syncopal went to sit down for a couple  "minutes.  She then got up and went to walk again and felt weak, near syncopal, slightly diaphoretic, no chest pain, no shortness of breath, and then was lowered to the floor by a coworker.  She reports waking up with people around her and that she did not remember anything else.  There was no spinning of her surroundings.  She had an EKG, EEG which was normal, negative perfusion stress test and wore 24-hour Holter which showed an 8-second atrial run, P AC, PVC, and 1 sinus pause which lasted 1.5 seconds.  She ultimately had an implantable loop recorder implanted 8/28/2020 for recurrent syncope.  [facility].    She presents today for reassessment.  She is having some arthritic issues with her knee and is considering surgery.  She had a spell of vertigo and had a carotid duplex in [place] which showed less than 50% bilateral carotid artery stenosis.  She has been seen by ENT and neurology for vertigo and has been practicing Epley maneuvers at home which helped.  She notes near syncope but no henry syncope.  She denies chest pain tightness and pressure.  Unfortunately, she continues to smoke.  She usually walks approximately 8 hours on the job 5 days a week has no exertional symptoms with that.  She reported one episode of chest tightness associated with shortness of breath a couple weeks ago but has not recurred since.  This episode did not last long and was actually nonexertional.        Allergies   Allergen Reactions   • Iodinated Diagnostic Agents Itching and Swelling           Family and social history reviewed.     ROS  All other systems were reviewed and are negative          Objective:     Vitals:    10/08/21 1447   BP: 104/66   BP Location: Left arm   Patient Position: Sitting   Pulse: 72   Weight: 71.2 kg (157 lb)   Height: 162.6 cm (64\")     Body mass index is 26.95 kg/m².    PHYSICAL EXAM:  Constitutional:       General: Not in acute distress.     Appearance: Well-developed. Not diaphoretic. "   HENT:      Head: Normocephalic.   Pulmonary:      Effort: Pulmonary effort is normal. No respiratory distress.      Breath sounds: Normal breath sounds. No wheezing. No rhonchi. No rales.   Cardiovascular:      Normal rate. Regular rhythm.   Pulses:     Radial: 2+ bilaterally.  Skin:     General: Skin is warm and dry. There is no cyanosis.      Findings: No rash.   Neurological:      Mental Status: Alert and oriented to person, place, and time.   Psychiatric:         Behavior: Behavior normal.         Thought Content: Thought content normal.         Judgment: Judgment normal.           ECG 12 Lead    Date/Time: 10/8/2021 6:19 PM  Performed by: Madhavi Le APRN  Authorized by: Madhavi Le APRN   Comparison: compared with previous ECG   Similar to previous ECG  Rhythm: sinus rhythm  Rate: normal  T inversion: III and aVF  QRS axis: left                Current Outpatient Medications   Medication Sig Dispense Refill   • aspirin 325 MG EC tablet TAKE 1 TABLET BY MOUTH DAILY FOR 90 DOSES     • doxepin (SINEquan) 25 MG capsule Take 25 mg by mouth At Night As Needed.     • fexofenadine-pseudoephedrine (ALLEGRA-D)  MG per 12 hr tablet Take as instructed by your provider     • fluticasone (FLONASE) 50 MCG/ACT nasal spray 1 spray into the nostril(s) as directed by provider Daily.     • levothyroxine (SYNTHROID, LEVOTHROID) 125 MCG tablet Take 125 mcg by mouth Daily.     • meclizine (ANTIVERT) 25 MG tablet Take 25 mg by mouth 3 (Three) Times a Day As Needed.     • potassium chloride (K-DUR) 10 MEQ CR tablet Take 10 mEq by mouth 2 (Two) Times a Day.     • simvastatin (ZOCOR) 20 MG tablet TAKE 1 TABLET BY MOUTH NIGHTLY FOR 90 DAYS     • valsartan-hydrochlorothiazide (DIOVAN-HCT) 160-25 MG per tablet TAKE 1 TABLET BY MOUTH EVERY DAY     • vitamin D (ERGOCALCIFEROL) 1.25 MG (16192 UT) capsule capsule Take 50,000 Units by mouth Every 7 (Seven) Days.     • nicotine (NICODERM CQ) 21 MG/24HR patch Place 1 patch on the  skin as directed by provider Daily. 28 each 1     No current facility-administered medications for this visit.     Assessment:      No diagnosis found.     No orders of the defined types were placed in this encounter.        Plan:       1.70-year-old pleasant -American female with coronary artery disease, normal left ventricular systolic function.  Status post drug-eluting stent placement to the mid left anterior descending other vessels free of disease in 2006.    She had normal LV systolic function September 2019 on echo.  She had some recent atypical chest pain.  We will have her return for walking protocol perfusion stress test to evaluate for ischemia  2.  Hypertension.she is maintained on Diovan/HCTZ  3.  Hyperlipidemia on simvastatin 20 mg LDL target 70 or less and she was at goal last year  4.  Tobacco use-again advised that she stop smoking  5.  Hypothyroidism on replacement therapy  6.  Recurrent syncope. History of positive tilt table test.  Felt to have some vasovagal component.  We'll continue following her implantable loop recorder but she has had no recent henry syncope  7.  History of vertigo as needed meclizine follows with ENT and practice Epley maneuvers at home  8.  Remote cerebral vascular accident and old lacunar infarct  9.  Obstructive sleep apnea she is not treating  10.  History of renal insufficiency  11.  Orthopedic issues-considering surgery but no scheduled date      Follow-up with me in 1 year and call with questions or concerns        It has been a pleasure to participate in this patient's care.      Thank you,  LILY Mace      **I used Dragon to dictate this note:**

## 2021-11-06 PROCEDURE — G2066 INTER DEVC REMOTE 30D: HCPCS | Performed by: INTERNAL MEDICINE

## 2021-11-06 PROCEDURE — 93298 REM INTERROG DEV EVAL SCRMS: CPT | Performed by: INTERNAL MEDICINE

## 2021-12-07 PROCEDURE — 93298 REM INTERROG DEV EVAL SCRMS: CPT | Performed by: INTERNAL MEDICINE

## 2021-12-07 PROCEDURE — G2066 INTER DEVC REMOTE 30D: HCPCS | Performed by: INTERNAL MEDICINE

## 2021-12-11 ENCOUNTER — IMMUNIZATION (OUTPATIENT)
Dept: VACCINE CLINIC | Facility: HOSPITAL | Age: 71
End: 2021-12-11

## 2021-12-11 PROCEDURE — 0004A HC ADM SARSCOV2 30MCG/0.3ML BOOSTER: CPT | Performed by: INTERNAL MEDICINE

## 2021-12-11 PROCEDURE — 91300 HC SARSCOV02 VAC 30MCG/0.3ML IM: CPT | Performed by: INTERNAL MEDICINE

## 2022-01-06 ENCOUNTER — TELEPHONE (OUTPATIENT)
Dept: CARDIOLOGY | Facility: CLINIC | Age: 72
End: 2022-01-06

## 2022-01-06 NOTE — TELEPHONE ENCOUNTER
Zofia with [facility] called for cardiac clearance note to be faxed to them.  She saw in the note that this is pending stress test.  I called and informed her stress test is scheduled for 1/11/22 and we will fax clearance once stress test is done.  Sending this to you as FAROOQ. / TROY Armijo with [facility]   phone [phone]  fax# [phone]

## 2022-01-07 PROCEDURE — 93298 REM INTERROG DEV EVAL SCRMS: CPT | Performed by: INTERNAL MEDICINE

## 2022-01-07 PROCEDURE — G2066 INTER DEVC REMOTE 30D: HCPCS | Performed by: INTERNAL MEDICINE

## 2022-01-11 ENCOUNTER — TELEPHONE (OUTPATIENT)
Dept: CARDIOLOGY | Facility: CLINIC | Age: 72
End: 2022-01-11

## 2022-01-11 DIAGNOSIS — R07.89 CHEST PAIN, ATYPICAL: Primary | ICD-10-CM

## 2022-01-11 NOTE — TELEPHONE ENCOUNTER
Madhavi,    Pt is having knee surgery on 1/26.  She is wanting to know if it is ok to put the stress test off until after the surgery because it will be hard for her to do the treadmill part.    Or if you want to change the testing order, then I can maybe get her in before the knee surgery.  Just let me know.    Thank you,  Ria

## 2022-01-19 ENCOUNTER — HOSPITAL ENCOUNTER (OUTPATIENT)
Dept: CARDIOLOGY | Facility: HOSPITAL | Age: 72
Discharge: HOME | End: 2022-01-19
Admitting: NURSE PRACTITIONER

## 2022-01-19 ENCOUNTER — TELEPHONE (OUTPATIENT)
Dept: CARDIOLOGY | Facility: CLINIC | Age: 72
End: 2022-01-19

## 2022-01-19 VITALS — HEIGHT: 63 IN | BODY MASS INDEX: 27.46 KG/M2 | WEIGHT: 155 LBS

## 2022-01-19 DIAGNOSIS — R07.89 CHEST PAIN, ATYPICAL: ICD-10-CM

## 2022-01-19 LAB
BH CV NUCLEAR PRIOR STUDY: 2
BH CV REST NUCLEAR ISOTOPE DOSE: 29 MCI
BH CV STRESS BP STAGE 1: NORMAL
BH CV STRESS BP STAGE 2: NORMAL
BH CV STRESS COMMENTS STAGE 1: NORMAL
BH CV STRESS COMMENTS STAGE 2: NORMAL
BH CV STRESS DOSE REGADENOSON STAGE 1: 0.4
BH CV STRESS DURATION MIN STAGE 1: 0
BH CV STRESS DURATION SEC STAGE 1: 10
BH CV STRESS HR STAGE 1: 92
BH CV STRESS NUCLEAR ISOTOPE DOSE: 29 MCI
BH CV STRESS PROTOCOL 1: NORMAL
BH CV STRESS RECOVERY BP: NORMAL MMHG
BH CV STRESS RECOVERY HR: 69 BPM
BH CV STRESS STAGE 1: 1
LV EF NUC BP: 69 %
MAXIMAL PREDICTED HEART RATE: 149 BPM
PERCENT MAX PREDICTED HR: 61.74 %
STRESS BASELINE BP: NORMAL MMHG
STRESS BASELINE HR: 58 BPM
STRESS PERCENT HR: 73 %
STRESS POST EXERCISE DUR SEC: 10 SEC
STRESS POST PEAK BP: NORMAL MMHG
STRESS POST PEAK HR: 92 BPM
STRESS TARGET HR: 127 BPM

## 2022-01-19 PROCEDURE — 78492 MYOCRD IMG PET MLT RST&STRS: CPT | Performed by: INTERNAL MEDICINE

## 2022-01-19 PROCEDURE — 93016 CV STRESS TEST SUPVJ ONLY: CPT | Performed by: INTERNAL MEDICINE

## 2022-01-19 PROCEDURE — 25010000002 REGADENOSON 0.4 MG/5ML SOLUTION: Performed by: NURSE PRACTITIONER

## 2022-01-19 PROCEDURE — A9555 RB82 RUBIDIUM: HCPCS | Performed by: NURSE PRACTITIONER

## 2022-01-19 PROCEDURE — 93017 CV STRESS TEST TRACING ONLY: CPT

## 2022-01-19 PROCEDURE — 78492 MYOCRD IMG PET MLT RST&STRS: CPT

## 2022-01-19 PROCEDURE — 93018 CV STRESS TEST I&R ONLY: CPT | Performed by: INTERNAL MEDICINE

## 2022-01-19 PROCEDURE — 0 RUBIDIUM CHLORIDE: Performed by: NURSE PRACTITIONER

## 2022-01-19 RX ADMIN — REGADENOSON 0.4 MG: 0.08 INJECTION, SOLUTION INTRAVENOUS at 08:15

## 2022-01-19 NOTE — TELEPHONE ENCOUNTER
Faxed last EKG and stress test along with a copy of this telephone thread to Zofia rayo [facility].  Fax# [phone].  Faxed confirmation received. / TROY

## 2022-02-07 PROCEDURE — 93298 REM INTERROG DEV EVAL SCRMS: CPT | Performed by: INTERNAL MEDICINE

## 2022-02-07 PROCEDURE — G2066 INTER DEVC REMOTE 30D: HCPCS | Performed by: INTERNAL MEDICINE

## 2022-03-10 PROCEDURE — 93298 REM INTERROG DEV EVAL SCRMS: CPT | Performed by: INTERNAL MEDICINE

## 2022-03-10 PROCEDURE — G2066 INTER DEVC REMOTE 30D: HCPCS | Performed by: INTERNAL MEDICINE

## 2022-04-10 PROCEDURE — 93298 REM INTERROG DEV EVAL SCRMS: CPT | Performed by: INTERNAL MEDICINE

## 2022-04-10 PROCEDURE — G2066 INTER DEVC REMOTE 30D: HCPCS | Performed by: INTERNAL MEDICINE

## 2022-05-03 ENCOUNTER — CLINICAL SUPPORT NO REQUIREMENTS (OUTPATIENT)
Dept: CARDIOLOGY | Facility: CLINIC | Age: 72
End: 2022-05-03

## 2022-05-03 ENCOUNTER — HOSPITAL ENCOUNTER (OUTPATIENT)
Dept: CARDIOLOGY | Facility: HOSPITAL | Age: 72
Discharge: HOME | End: 2022-05-03
Admitting: NURSE PRACTITIONER

## 2022-05-03 ENCOUNTER — OFFICE VISIT (OUTPATIENT)
Dept: CARDIOLOGY | Facility: CLINIC | Age: 72
End: 2022-05-03

## 2022-05-03 VITALS
DIASTOLIC BLOOD PRESSURE: 70 MMHG | SYSTOLIC BLOOD PRESSURE: 122 MMHG | HEART RATE: 67 BPM | WEIGHT: 137 LBS | BODY MASS INDEX: 24.27 KG/M2 | HEIGHT: 63 IN

## 2022-05-03 DIAGNOSIS — F17.200 TOBACCO DEPENDENCE: ICD-10-CM

## 2022-05-03 DIAGNOSIS — R55 SYNCOPE AND COLLAPSE: Primary | ICD-10-CM

## 2022-05-03 DIAGNOSIS — E78.2 MIXED HYPERLIPIDEMIA: ICD-10-CM

## 2022-05-03 DIAGNOSIS — I49.3 PVC (PREMATURE VENTRICULAR CONTRACTION): ICD-10-CM

## 2022-05-03 DIAGNOSIS — I65.23 CAROTID STENOSIS, BILATERAL: ICD-10-CM

## 2022-05-03 DIAGNOSIS — R55 SYNCOPE AND COLLAPSE: ICD-10-CM

## 2022-05-03 DIAGNOSIS — I10 ESSENTIAL HYPERTENSION: ICD-10-CM

## 2022-05-03 DIAGNOSIS — R55 RECURRENT SYNCOPE: ICD-10-CM

## 2022-05-03 DIAGNOSIS — I25.10 CORONARY ARTERY DISEASE INVOLVING NATIVE CORONARY ARTERY OF NATIVE HEART WITHOUT ANGINA PECTORIS: Primary | ICD-10-CM

## 2022-05-03 LAB
AORTIC ARCH: 2.7 CM
ASCENDING AORTA: 3.6 CM
BH CV ECHO MEAS - ACS: 1.72 CM
BH CV ECHO MEAS - AO MAX PG: 6.8 MMHG
BH CV ECHO MEAS - AO MEAN PG: 3.2 MMHG
BH CV ECHO MEAS - AO ROOT DIAM: 2.8 CM
BH CV ECHO MEAS - AO V2 MAX: 130.3 CM/SEC
BH CV ECHO MEAS - AO V2 VTI: 23.8 CM
BH CV ECHO MEAS - AVA(I,D): 1.99 CM2
BH CV ECHO MEAS - EDV(CUBED): 75.4 ML
BH CV ECHO MEAS - EDV(MOD-SP2): 74 ML
BH CV ECHO MEAS - EDV(MOD-SP4): 67 ML
BH CV ECHO MEAS - EF(MOD-BP): 53.7 %
BH CV ECHO MEAS - EF(MOD-SP2): 52.7 %
BH CV ECHO MEAS - EF(MOD-SP4): 50.7 %
BH CV ECHO MEAS - ESV(CUBED): 28.7 ML
BH CV ECHO MEAS - ESV(MOD-SP2): 35 ML
BH CV ECHO MEAS - ESV(MOD-SP4): 33 ML
BH CV ECHO MEAS - FS: 27.6 %
BH CV ECHO MEAS - IVS/LVPW: 0.96 CM
BH CV ECHO MEAS - IVSD: 1.17 CM
BH CV ECHO MEAS - LAT PEAK E' VEL: 7.3 CM/SEC
BH CV ECHO MEAS - LV DIASTOLIC VOL/BSA (35-75): 40.2 CM2
BH CV ECHO MEAS - LV MASS(C)D: 179 GRAMS
BH CV ECHO MEAS - LV MAX PG: 3.2 MMHG
BH CV ECHO MEAS - LV MEAN PG: 1.76 MMHG
BH CV ECHO MEAS - LV SYSTOLIC VOL/BSA (12-30): 19.8 CM2
BH CV ECHO MEAS - LV V1 MAX: 89.5 CM/SEC
BH CV ECHO MEAS - LV V1 VTI: 16.6 CM
BH CV ECHO MEAS - LVIDD: 4.2 CM
BH CV ECHO MEAS - LVIDS: 3.1 CM
BH CV ECHO MEAS - LVOT AREA: 2.8 CM2
BH CV ECHO MEAS - LVOT DIAM: 1.9 CM
BH CV ECHO MEAS - LVPWD: 1.22 CM
BH CV ECHO MEAS - MED PEAK E' VEL: 5.1 CM/SEC
BH CV ECHO MEAS - MR MAX PG: 28.9 MMHG
BH CV ECHO MEAS - MR MAX VEL: 268.8 CM/SEC
BH CV ECHO MEAS - MV A DUR: 0.12 SEC
BH CV ECHO MEAS - MV A MAX VEL: 70.7 CM/SEC
BH CV ECHO MEAS - MV DEC SLOPE: 154.5 CM/SEC2
BH CV ECHO MEAS - MV DEC TIME: 0.26 MSEC
BH CV ECHO MEAS - MV E MAX VEL: 48.8 CM/SEC
BH CV ECHO MEAS - MV E/A: 0.69
BH CV ECHO MEAS - MV MAX PG: 3.5 MMHG
BH CV ECHO MEAS - MV MEAN PG: 1.13 MMHG
BH CV ECHO MEAS - MV P1/2T: 120.9 MSEC
BH CV ECHO MEAS - MV V2 VTI: 30.4 CM
BH CV ECHO MEAS - MVA(P1/2T): 1.82 CM2
BH CV ECHO MEAS - MVA(VTI): 1.55 CM2
BH CV ECHO MEAS - PA ACC TIME: 0.1 SEC
BH CV ECHO MEAS - PA PR(ACCEL): 36.2 MMHG
BH CV ECHO MEAS - PA V2 MAX: 71.9 CM/SEC
BH CV ECHO MEAS - PULM A REVS DUR: 0.11 SEC
BH CV ECHO MEAS - PULM A REVS VEL: 29.1 CM/SEC
BH CV ECHO MEAS - PULM DIAS VEL: 40.7 CM/SEC
BH CV ECHO MEAS - PULM S/D: 1.36
BH CV ECHO MEAS - PULM SYS VEL: 55.3 CM/SEC
BH CV ECHO MEAS - QP/QS: 0.66
BH CV ECHO MEAS - RAP SYSTOLE: 3 MMHG
BH CV ECHO MEAS - RV MAX PG: 1.3 MMHG
BH CV ECHO MEAS - RV V1 MAX: 57 CM/SEC
BH CV ECHO MEAS - RV V1 VTI: 12.2 CM
BH CV ECHO MEAS - RVOT DIAM: 1.8 CM
BH CV ECHO MEAS - RVSP: 39.6 MMHG
BH CV ECHO MEAS - SI(MOD-SP2): 23.4 ML/M2
BH CV ECHO MEAS - SI(MOD-SP4): 20.4 ML/M2
BH CV ECHO MEAS - SUP REN AO DIAM: 2.2 CM
BH CV ECHO MEAS - SV(LVOT): 47.2 ML
BH CV ECHO MEAS - SV(MOD-SP2): 39 ML
BH CV ECHO MEAS - SV(MOD-SP4): 34 ML
BH CV ECHO MEAS - SV(RVOT): 31.2 ML
BH CV ECHO MEAS - TAPSE (>1.6): 1.73 CM
BH CV ECHO MEAS - TR MAX PG: 36.6 MMHG
BH CV ECHO MEAS - TR MAX VEL: 302.7 CM/SEC
BH CV ECHO MEASUREMENTS AVERAGE E/E' RATIO: 7.87
BH CV XLRA - RV BASE: 2.36 CM
BH CV XLRA - RV LENGTH: 6.2 CM
BH CV XLRA - RV MID: 1.54 CM
BH CV XLRA - TDI S': 13.9 CM/SEC
LEFT ATRIUM VOLUME INDEX: 20.4 ML/M2
MAXIMAL PREDICTED HEART RATE: 149 BPM
SINUS: 2.8 CM
STJ: 3.1 CM
STRESS TARGET HR: 127 BPM

## 2022-05-03 PROCEDURE — 99214 OFFICE O/P EST MOD 30 MIN: CPT | Performed by: NURSE PRACTITIONER

## 2022-05-03 PROCEDURE — 93306 TTE W/DOPPLER COMPLETE: CPT

## 2022-05-03 PROCEDURE — 93306 TTE W/DOPPLER COMPLETE: CPT | Performed by: INTERNAL MEDICINE

## 2022-05-03 PROCEDURE — 93291 INTERROG DEV EVAL SCRMS IP: CPT | Performed by: INTERNAL MEDICINE

## 2022-05-03 PROCEDURE — 93000 ELECTROCARDIOGRAM COMPLETE: CPT | Performed by: NURSE PRACTITIONER

## 2022-05-03 NOTE — PROGRESS NOTES
Date of Office Visit: 22  Encounter Provider: LILY Mace  Place of Service: [facility] CARDIOLOGY  Patient Name: Jenn Cooper  :1950    Chief Complaint   Patient presents with   • Coronary artery disease involving native coronary artery of   • Syncope   • Fatigue   • Dizziness   • Follow-up   :     HPI: Jenn Cooper is a 71 y.o. female  with coronary artery disease, hypertension, hyperlipidemia, hypothyroidism, carotid stenosis, recurrent syncope, cerebrovascular accident, renal insufficiency, obstructive sleep apnea, vertigo, and tobacco use.     She was followed by Dr. Roc Kelly and I will visit with her in follow-up today.  This is a close friend to my family.      She had anginal symptoms and in 2006 had cardiac catheterization showed 70% stenosis of the mid left anterior descending other vessels free of disease she had a 2.75 x 18 mm Cypher drug-eluting stent placed.  And in  had follow-up catheterization that was unremarkable.  And then she also had problems with syncopal near syncopal spells had an episode and at that time had a near syncopal spell but had a tilt table test in .  That was apparently positive and was on acebutolol for a short period of time.    At some point her heart rate was captured in the 40s.  When she was seen in 2019 she denied angina.  She was to have an ENT evaluation for possible vertigo.  Her heart rate was in the 60s at that time.  Echocardiogram at [facility] showed normal left ventricular systolic function, mild diastolic dysfunction, aortic valve calcification and moderate tricuspid insufficiency.  In 2020 she was evaluated at [facility] due to a recurrent syncopal spell which occurred this Tuesday morning.  She was at work where she works as a .  She bent over to pick something up and had lightheadedness.  She felt near syncopal went to sit down for a  "couple minutes.  She then got up and went to walk again and felt weak, near syncopal, slightly diaphoretic, no chest pain, no shortness of breath, and then was lowered to the floor by a coworker.  She reports waking up with people around her and that she did not remember anything else.  There was no spinning of her surroundings.  She had an EKG, EEG which was normal, negative perfusion stress test and wore 24-hour Holter which showed an 8-second atrial run, P AC, PVC, and 1 sinus pause which lasted 1.5 seconds.  She ultimately had an implantable loop recorder implanted 8/28/2020 for recurrent syncope.     She presents today for reassessment.  She had left knee surgery in January and completed physical therapy.  She has been cleared by orthopedics to return to work but then on Thursday, 4/22/2022 had a syncopal spell.  She was in her ENT office and had Epley maneuver therapy.  She got dizzy but then when she stood up she passed out for a second or 2.  There was some concern about her blood pressure dropping but her PCP did orthostatics and those were negative.  She was found to have hemoglobin 10.8 and referred to hematology for further evaluation of anemia.  She denies chest pain tightness pressure or shortness of breath or swelling.  Unfortunately she continues to smoke but she has CPAP therapy.  She has been having nausea on occasion after eating.  She is lost some weight                 Allergies   Allergen Reactions   • Iodinated Diagnostic Agents Itching and Swelling           Family and social history reviewed.     ROS  All other systems were reviewed and are negative          Objective:     Vitals:    05/03/22 1108 05/03/22 1124   BP: 122/70    BP Location: Left arm    Patient Position: Sitting    Pulse: (!) 47 67   Weight: 62.1 kg (137 lb)    Height: 160 cm (63\")      Body mass index is 24.27 kg/m².    PHYSICAL EXAM:  Pulmonary:      Effort: Pulmonary effort is normal.      Breath sounds: Normal breath " sounds.   Cardiovascular:      Normal rate. Regular rhythm.           ECG 12 Lead    Date/Time: 5/3/2022 11:24 AM  Performed by: Madhavi Le APRN  Authorized by: Madhavi Le APRN   Comparison: compared with previous ECG   Similar to previous ECG  Rhythm: sinus rhythm  Rate: normal  QRS axis: left  Comments: No change              Current Outpatient Medications   Medication Sig Dispense Refill   • aspirin 325 MG EC tablet TAKE 1 TABLET BY MOUTH DAILY FOR 90 DOSES     • doxepin (SINEquan) 25 MG capsule Take 25 mg by mouth At Night As Needed.     • fexofenadine-pseudoephedrine (ALLEGRA-D)  MG per 12 hr tablet Take as instructed by your provider     • fluticasone (FLONASE) 50 MCG/ACT nasal spray 1 spray into the nostril(s) as directed by provider Daily.     • levothyroxine (SYNTHROID, LEVOTHROID) 125 MCG tablet Take 125 mcg by mouth Daily.     • meclizine (ANTIVERT) 25 MG tablet Take 25 mg by mouth 3 (Three) Times a Day As Needed.     • potassium chloride (K-DUR) 10 MEQ CR tablet Take 10 mEq by mouth 2 (Two) Times a Day.     • simvastatin (ZOCOR) 20 MG tablet TAKE 1 TABLET BY MOUTH NIGHTLY FOR 90 DAYS     • valsartan-hydrochlorothiazide (DIOVAN-HCT) 160-25 MG per tablet TAKE 1 TABLET BY MOUTH EVERY DAY     • vitamin D (ERGOCALCIFEROL) 1.25 MG (81649 UT) capsule capsule Take 50,000 Units by mouth Every 7 (Seven) Days.       No current facility-administered medications for this visit.     Assessment:       Diagnosis Plan   1. Coronary artery disease involving native coronary artery of native heart without angina pectoris  ECG 12 Lead   2. Mixed hyperlipidemia     3. Tobacco dependence     4. Essential hypertension     5. Recurrent syncope     6. PVC (premature ventricular contraction)     7. Carotid stenosis, bilateral     8. Syncope and collapse  Adult Transthoracic Echo Complete W/ Cont if Necessary Per Protocol        Orders Placed This Encounter   Procedures   • ECG 12 Lead     This order was created via  procedure documentation     Order Specific Question:   Release to patient     Answer:   Immediate   • Adult Transthoracic Echo Complete W/ Cont if Necessary Per Protocol     Standing Status:   Future     Number of Occurrences:   1     Standing Expiration Date:   5/3/2023     Order Specific Question:   Reason for exam?     Answer:   Syncope         Plan:       1.71-year-old pleasant -American female with coronary artery disease, normal left ventricular systolic function.  Status post drug-eluting stent placement to the mid left anterior descending other vessels free of disease in 2006.    She had normal LV systolic function September 2019 on echo.  Perfusion stress test January 2022 negative for ischemia  2.  Hypertension.she is maintained on Diovan/HCTZ.  Orthostatic blood pressure with her PCP was negative recently.  Continue current dose 3.  Hyperlipidemia on simvastatin 20 mg LDL target 70 or less and she was at goal last year  4.  Tobacco use-again advised that she stop smoking  5.  Hypothyroidism on replacement therapy  6.  Recurrent syncope. History of positive tilt table test.  Previously felt to have some vasovagal component.    Last episode 4/22/2022 at the ENT office after Epley maneuvers.  She became dizzy and had brief syncope but no injury.  Device interrogation today showed no associated arrhythmia.  Echocardiogram shows normal left ventricular systolic function  And moderate TR unchanged from 2019.  She is cleared to return to work from cardiovascular standpoint  7.  History of vertigo as needed meclizine follows with ENT  8.  Remote cerebral vascular accident and old lacunar infarct  9.  Obstructive sleep apnea she is not treating  10.  History of renal insufficiency  11.  Carotid artery stenosis  - less than 50% bilateral on duplex 09/2021 at [place]  12. s/p left knee surgery- she has completed Pt and is cleared to return to work from orthopedic standpoint   13.  Macrocytic anemia follows  with Dr. Ailyn Lobo with [facility].  Most recent hemoglobin 10.8.  She is currently undergoing an anemic evaluation and reportedly has some outstanding lab              It has been a pleasure to participate in this patient's care.      Thank you,  LILY Mace      **I used Dragon to dictate this note:**

## 2022-05-11 PROCEDURE — 93298 REM INTERROG DEV EVAL SCRMS: CPT | Performed by: INTERNAL MEDICINE

## 2022-05-11 PROCEDURE — G2066 INTER DEVC REMOTE 30D: HCPCS | Performed by: INTERNAL MEDICINE

## 2022-06-04 ENCOUNTER — APPOINTMENT (OUTPATIENT)
Dept: VACCINE CLINIC | Facility: HOSPITAL | Age: 72
End: 2022-06-04

## 2022-10-13 PROCEDURE — 93298 REM INTERROG DEV EVAL SCRMS: CPT | Performed by: INTERNAL MEDICINE

## 2022-10-13 PROCEDURE — G2066 INTER DEVC REMOTE 30D: HCPCS | Performed by: INTERNAL MEDICINE

## 2022-11-13 PROCEDURE — G2066 INTER DEVC REMOTE 30D: HCPCS | Performed by: INTERNAL MEDICINE

## 2022-11-13 PROCEDURE — 93298 REM INTERROG DEV EVAL SCRMS: CPT | Performed by: INTERNAL MEDICINE

## 2022-11-18 ENCOUNTER — OFFICE VISIT (OUTPATIENT)
Dept: CARDIOLOGY | Facility: CLINIC | Age: 72
End: 2022-11-18

## 2022-11-18 VITALS
WEIGHT: 146 LBS | HEART RATE: 71 BPM | BODY MASS INDEX: 25.87 KG/M2 | SYSTOLIC BLOOD PRESSURE: 142 MMHG | DIASTOLIC BLOOD PRESSURE: 82 MMHG | HEIGHT: 63 IN

## 2022-11-18 DIAGNOSIS — I25.10 CORONARY ARTERY DISEASE INVOLVING NATIVE CORONARY ARTERY OF NATIVE HEART WITHOUT ANGINA PECTORIS: Primary | ICD-10-CM

## 2022-11-18 PROCEDURE — 99214 OFFICE O/P EST MOD 30 MIN: CPT | Performed by: INTERNAL MEDICINE

## 2022-11-18 NOTE — PROGRESS NOTES
Subjective:     Encounter Date:11/18/2022      Patient ID: Jenn Cooper is a 71 y.o. female.    Chief Complaint:  HPI:   This is a 71-year-old woman with a history of hypertension, CVA, CAD.  In 2006 had a cardiac catheterization showing a 70% mid LAD disease status post PCI using a 2.75 x 18 mmCypher drug-eluting stents.  She has a recorder in place for history of syncope.  Since the loop was implanted she has not had a syncopal event.  She is not dizzy.  She is pretty active.  She works as a  at the [employer].  She lifts and walks without angina or dyspnea.  No palpitations.  Her lipids are at goal, LDL is 55.  HDL little bit low at 43.  Pressure today is somewhat elevated at 140/80 but at home her systolics are in the 110s to 120s.  She is careful about her diet however she continues to smoke half pack per day.  She is not currently interested in quitting smoking but is thinking about it and the new year.    The following portions of the patient's history were reviewed and updated as appropriate: allergies, current medications, past family history, past medical history, past social history, past surgical history and problem list.     REVIEW OF SYSTEMS:   All systems reviewed.  Pertinent positives identified in HPI.  All other systems are negative.    Past Medical History:   Diagnosis Date   • Contraindication to deep vein thrombosis (DVT) prophylaxis    • HTN (hypertension)    • Hyperlipidemia    • Hypothyroid    • Lightheadedness    • Sinus bradycardia    • Sinus pause    • Syncope    • Vertigo        Family History   Problem Relation Age of Onset   • Diabetes Mother    • Hypertension Mother    • Heart disease Maternal Uncle         pacemaker   • Stroke Maternal Grandfather    • Hypertension Son    • Hypertension Daughter    • Hypertension Daughter        Social History     Socioeconomic History   • Marital status:    Tobacco Use   • Smoking status: Every Day     Packs/day: 0.50      Years: 20.00     Pack years: 10.00     Types: Cigarettes   • Smokeless tobacco: Never   • Tobacco comments:     caffeine use  - coffee, tea or soda   Substance and Sexual Activity   • Alcohol use: No   • Drug use: Never   • Sexual activity: Defer       Allergies   Allergen Reactions   • Iodinated Diagnostic Agents Itching and Swelling       Past Surgical History:   Procedure Laterality Date   • CARDIAC ELECTROPHYSIOLOGY PROCEDURE N/A 08/28/2020    Procedure: Loop insertion;  Surgeon: Carl Calderon MD;  Location: [facility] INVASIVE LOCATION;  Service: Cardiovascular;  Laterality: N/A;   • REPLACEMENT TOTAL KNEE Left 01/26/2022       Procedures       Objective:         PHYSICAL EXAM:  GEN: VSS, no distress,   Eyes: normal sclera, normal lids and lashes  HENT: moist mucus membranes,   Respiratory: CTAB, no rales or wheezes  CV: RRR, no murmurs, , +2 DP and 2+ carotid pulses b/l  GI: NABS, soft,  Nontender, nondistended  MSK: no edema, no scoliosis or kyphosis  Skin: no rash, warm, dry  Heme/Lymph: no bruising or bleeding  Psych: organized thought, normal behavior and affect  Neuro: Cranial nerves grossly intact, Alert and Oriented x 3.         Assessment:          Diagnosis Plan   1. Coronary artery disease involving native coronary artery of native heart without angina pectoris               Plan:       1.  CAD: Status post remote and mid LAD drug-eluting stent placement.  CCS class 0 symptoms.  She is on a full dose aspirin presumably related to the history of CVA.  2.  CVA: Aspirin and statin  3.  Hyperlipidemia: At goal  4.  CKD sees Dr. Saran Luna  5.  Hypertension: Well-controlled on valsartan/HCTZ  6.  Tobacco abuse: Currently smoking half pack per day.  We discussed Chantix, Wellbutrin, patches and gum.  She is considering it in the new year.  7.  Syncope: Unexplained.  Loop recorder in place, no arrhythmias noted.  No further syncope.    Oriana, thank you very much for referring this kind patient to  me. Please call me with any questions or concerns. I will see the patient again in the office in 1 year.         Ynes Banuelos MD  11/18/22  [facility]    Outpatient Encounter Medications as of 11/18/2022   Medication Sig Dispense Refill   • aspirin 325 MG EC tablet TAKE 1 TABLET BY MOUTH DAILY FOR 90 DOSES     • doxepin (SINEquan) 25 MG capsule Take 25 mg by mouth At Night As Needed.     • fexofenadine-pseudoephedrine (ALLEGRA-D)  MG per 12 hr tablet Take as instructed by your provider     • fluticasone (FLONASE) 50 MCG/ACT nasal spray 1 spray into the nostril(s) as directed by provider Daily.     • levothyroxine (SYNTHROID, LEVOTHROID) 125 MCG tablet Take 125 mcg by mouth Daily.     • meclizine (ANTIVERT) 25 MG tablet Take 25 mg by mouth 3 (Three) Times a Day As Needed.     • potassium chloride (K-DUR) 10 MEQ CR tablet Take 10 mEq by mouth 2 (Two) Times a Day.     • simvastatin (ZOCOR) 20 MG tablet TAKE 1 TABLET BY MOUTH NIGHTLY FOR 90 DAYS     • valsartan-hydrochlorothiazide (DIOVAN-HCT) 160-25 MG per tablet TAKE 1 TABLET BY MOUTH EVERY DAY     • vitamin D (ERGOCALCIFEROL) 1.25 MG (23962 UT) capsule capsule Take 50,000 Units by mouth Every 7 (Seven) Days.       No facility-administered encounter medications on file as of 11/18/2022.

## 2022-12-14 PROCEDURE — G2066 INTER DEVC REMOTE 30D: HCPCS | Performed by: INTERNAL MEDICINE

## 2022-12-14 PROCEDURE — 93298 REM INTERROG DEV EVAL SCRMS: CPT | Performed by: INTERNAL MEDICINE

## 2023-01-14 PROCEDURE — 93298 REM INTERROG DEV EVAL SCRMS: CPT | Performed by: INTERNAL MEDICINE

## 2023-01-14 PROCEDURE — G2066 INTER DEVC REMOTE 30D: HCPCS | Performed by: INTERNAL MEDICINE

## 2023-02-14 PROCEDURE — 93298 REM INTERROG DEV EVAL SCRMS: CPT | Performed by: INTERNAL MEDICINE

## 2023-02-14 PROCEDURE — G2066 INTER DEVC REMOTE 30D: HCPCS | Performed by: INTERNAL MEDICINE

## 2023-03-17 PROCEDURE — G2066 INTER DEVC REMOTE 30D: HCPCS | Performed by: INTERNAL MEDICINE

## 2023-03-17 PROCEDURE — 93298 REM INTERROG DEV EVAL SCRMS: CPT | Performed by: INTERNAL MEDICINE

## 2023-04-17 PROCEDURE — 93298 REM INTERROG DEV EVAL SCRMS: CPT | Performed by: INTERNAL MEDICINE

## 2023-04-17 PROCEDURE — G2066 INTER DEVC REMOTE 30D: HCPCS | Performed by: INTERNAL MEDICINE

## 2023-07-19 PROCEDURE — G2066 INTER DEVC REMOTE 30D: HCPCS | Performed by: INTERNAL MEDICINE

## 2023-07-19 PROCEDURE — 93298 REM INTERROG DEV EVAL SCRMS: CPT | Performed by: INTERNAL MEDICINE

## 2023-08-19 PROCEDURE — 93298 REM INTERROG DEV EVAL SCRMS: CPT | Performed by: INTERNAL MEDICINE

## 2023-08-19 PROCEDURE — G2066 INTER DEVC REMOTE 30D: HCPCS | Performed by: INTERNAL MEDICINE

## 2023-11-20 ENCOUNTER — OFFICE VISIT (OUTPATIENT)
Age: 73
End: 2023-11-20
Payer: MEDICARE

## 2023-11-20 VITALS
WEIGHT: 154 LBS | SYSTOLIC BLOOD PRESSURE: 128 MMHG | BODY MASS INDEX: 27.29 KG/M2 | HEIGHT: 63 IN | DIASTOLIC BLOOD PRESSURE: 78 MMHG | HEART RATE: 73 BPM

## 2023-11-20 DIAGNOSIS — I25.10 CORONARY ARTERY DISEASE INVOLVING NATIVE CORONARY ARTERY OF NATIVE HEART WITHOUT ANGINA PECTORIS: Primary | ICD-10-CM

## 2023-11-20 PROCEDURE — 1160F RVW MEDS BY RX/DR IN RCRD: CPT | Performed by: INTERNAL MEDICINE

## 2023-11-20 PROCEDURE — 99214 OFFICE O/P EST MOD 30 MIN: CPT | Performed by: INTERNAL MEDICINE

## 2023-11-20 PROCEDURE — 1159F MED LIST DOCD IN RCRD: CPT | Performed by: INTERNAL MEDICINE

## 2023-11-20 PROCEDURE — 93000 ELECTROCARDIOGRAM COMPLETE: CPT | Performed by: INTERNAL MEDICINE

## 2023-11-20 NOTE — PROGRESS NOTES
Subjective:     Encounter Date:11/20/23        Patient ID: Jenn Cooper is a 72 y.o. female.    Chief Complaint:  HPI:   This is a 72-year-old woman with a history of hypertension, CVA, CAD.  In 2006 had a cardiac catheterization showing a 70% mid LAD disease status post PCI using a 2.75 x 18 mmCypher drug-eluting stents.  She has a recorder in place for history of syncope.  Since the loop was implanted she has not had a syncopal event.     She returns today. She is active at work and is able to climb stairs without angina or dyspnea. No palpitations. Sinus soraya is the usual rhythm on loop recorder, average HR in the 50s, asymptomatic without dizziness or syncope. She continues to smoke. She has a lot of life stress; her daughter needs a  shunt and her grandson is using drugs. She uses cigarettes as stress relief, half pack a day.       The following portions of the patient's history were reviewed and updated as appropriate: allergies, current medications, past family history, past medical history, past social history, past surgical history and problem list.     REVIEW OF SYSTEMS:   All systems reviewed.  Pertinent positives identified in HPI.  All other systems are negative.    Past Medical History:   Diagnosis Date    Contraindication to deep vein thrombosis (DVT) prophylaxis     HTN (hypertension)     Hyperlipidemia     Hypothyroid     Lightheadedness     Sinus bradycardia     Sinus pause     Syncope     Vertigo        Family History   Problem Relation Age of Onset    Diabetes Mother     Hypertension Mother     Heart disease Maternal Uncle         pacemaker    Stroke Maternal Grandfather     Hypertension Son     Hypertension Daughter     Hypertension Daughter        Social History     Socioeconomic History    Marital status:    Tobacco Use    Smoking status: Every Day     Packs/day: 0.50     Years: 20.00     Additional pack years: 0.00     Total pack years: 10.00     Types: Cigarettes     Smokeless tobacco: Never    Tobacco comments:     caffeine use  - coffee, tea or soda   Vaping Use    Vaping Use: Never used   Substance and Sexual Activity    Alcohol use: No    Drug use: Never    Sexual activity: Defer       Allergies   Allergen Reactions    Iodinated Contrast Media Itching and Swelling       Past Surgical History:   Procedure Laterality Date    CARDIAC ELECTROPHYSIOLOGY PROCEDURE N/A 08/28/2020    Procedure: Loop insertion;  Surgeon: Carl Calderon MD;  Location: [facility] CATH INVASIVE LOCATION;  Service: Cardiovascular;  Laterality: N/A;    REPLACEMENT TOTAL KNEE Left 01/26/2022         ECG 12 Lead    Date/Time: 11/20/2023 3:44 PM  Performed by: Ynes Banuelos MD    Authorized by: Ynes Banuelos MD  Comparison: compared with previous ECG from 5/3/2022  Similar to previous ECG  Rhythm: sinus rhythm  Rate: normal  Conduction: conduction normal  ST Segments: ST segments normal  T flattening: all  QRS axis: left  Other findings: non-specific ST-T wave changes    Clinical impression: non-specific ECG           Objective:         PHYSICAL EXAM:  GEN: VSS, no distress,   Eyes: normal sclera, normal lids and lashes  HENT: moist mucus membranes,   Respiratory: CTAB, no rales or wheezes  CV: RRR, no murmurs, , +2 DP and 2+ carotid pulses b/l  GI: NABS, soft,  Nontender, nondistended  MSK: no edema, no scoliosis or kyphosis  Skin: no rash, warm, dry  Heme/Lymph: no bruising or bleeding  Psych: organized thought, normal behavior and affect  Neuro: Cranial nerves grossly intact, Alert and Oriented x 3.         Assessment:          Diagnosis Plan   1. Coronary artery disease involving native coronary artery of native heart without angina pectoris               Plan:       1.  CAD: Status post remote and mid LAD drug-eluting stent placement.  CCS class 0 symptoms.  She is on a full dose aspirin presumably related to the history of CVA. Normal  nuclear stress 2022  2.  CVA: Aspirin and statin  3.   Hyperlipidemia: At goal  4.  CKD sees Dr. Saran Luna  5.  Hypertension: Well-controlled on valsartan/HCTZ  6.  Tobacco abuse: Currently smoking half pack per day.  Not quite ready to quit but could cut back.   7.  Syncope: Unexplained.  Loop recorder in place, no arrhythmias noted. Sinus soraya, asymptomatic    Oriana, thank you very much for referring this kind patient to me. Please call me with any questions or concerns. I will see the patient again in the office in 1 year.         Ynes Banuelos MD  11/20/23  [facility]    Outpatient Encounter Medications as of 11/20/2023   Medication Sig Dispense Refill    aspirin 325 MG EC tablet TAKE 1 TABLET BY MOUTH DAILY FOR 90 DOSES      doxepin (SINEquan) 25 MG capsule Take 1 capsule by mouth At Night As Needed.      fexofenadine-pseudoephedrine (ALLEGRA-D)  MG per 12 hr tablet Take as instructed by your provider      fluticasone (FLONASE) 50 MCG/ACT nasal spray 1 spray into the nostril(s) as directed by provider Daily.      levothyroxine (SYNTHROID, LEVOTHROID) 125 MCG tablet Take 1 tablet by mouth Daily.      meclizine (ANTIVERT) 25 MG tablet Take 1 tablet by mouth 3 (Three) Times a Day As Needed.      potassium chloride (K-DUR) 10 MEQ CR tablet Take 1 tablet by mouth 2 (Two) Times a Day.      simvastatin (ZOCOR) 20 MG tablet TAKE 1 TABLET BY MOUTH NIGHTLY FOR 90 DAYS      valsartan-hydrochlorothiazide (DIOVAN-HCT) 160-25 MG per tablet TAKE 1 TABLET BY MOUTH EVERY DAY       No facility-administered encounter medications on file as of 11/20/2023.

## 2024-02-26 ENCOUNTER — TELEPHONE (OUTPATIENT)
Age: 74
End: 2024-02-26
Payer: MEDICARE

## 2024-02-26 NOTE — TELEPHONE ENCOUNTER
Patient with linq implanted for syncope, remote transmission reviewed today documented RRT reached on 2/24/24.  Patient hasn't had any events since implant. I LVM for patient that device has reached RRT and the recommendation is to leave the device, I gave patient my direct call back number if she has any questions.

## 2024-11-21 ENCOUNTER — OFFICE VISIT (OUTPATIENT)
Dept: CARDIOLOGY | Facility: CLINIC | Age: 74
End: 2024-11-21
Payer: MEDICARE

## 2024-11-21 VITALS
WEIGHT: 157 LBS | BODY MASS INDEX: 27.82 KG/M2 | HEIGHT: 63 IN | DIASTOLIC BLOOD PRESSURE: 80 MMHG | HEART RATE: 65 BPM | SYSTOLIC BLOOD PRESSURE: 130 MMHG

## 2024-11-21 DIAGNOSIS — I25.10 CORONARY ARTERY DISEASE INVOLVING NATIVE CORONARY ARTERY OF NATIVE HEART WITHOUT ANGINA PECTORIS: Primary | ICD-10-CM

## 2024-11-21 PROCEDURE — 1159F MED LIST DOCD IN RCRD: CPT | Performed by: NURSE PRACTITIONER

## 2024-11-21 PROCEDURE — 99214 OFFICE O/P EST MOD 30 MIN: CPT | Performed by: NURSE PRACTITIONER

## 2024-11-21 PROCEDURE — 1160F RVW MEDS BY RX/DR IN RCRD: CPT | Performed by: NURSE PRACTITIONER

## 2024-11-21 PROCEDURE — 93000 ELECTROCARDIOGRAM COMPLETE: CPT | Performed by: NURSE PRACTITIONER

## 2024-11-21 NOTE — PROGRESS NOTES
Date of Office Visit: 24  Encounter Provider: LILY Mace  Place of Service: [facility] CARDIOLOGY  Patient Name: Jenn Cooper  :1950    Chief Complaint   Patient presents with    Coronary artery disease involving native coronary artery of    Follow-up   :     HPI: Jenn Cooper is a 73 y.o. female  with coronary artery disease, hypertension, hyperlipidemia, hypothyroidism, carotid stenosis, recurrent syncope, history of lacunar infarct cerebrovascular accident, renal insufficiency, obstructive sleep apnea, vertigo, and tobacco use.     She was followed by Dr. Roc Kelly. She is now followed by Dr. Ynes Banuelos.  I will visit with her in follow-up and have reviewed her medical record.      She is a close friend to my family.      She had anginal symptoms and in 2006 had cardiac catheterization showed 70% stenosis of the mid left anterior descending other vessels free of disease she had a 2.75 x 18 mm Cypher drug-eluting stent placed.  And in  had follow-up catheterization that was unremarkable.   She reportedly had a positive tilt table test in  and was on acebutolol temporarily.    At some point her heart rate was captured in the 40s.  She later was felt to have vertigo.      Her heart rate was in the 60s at that time.  [facility] showed normal left ventricular systolic function, mild diastolic dysfunction, aortic valve calcification and moderate tricuspid insufficiency.  In 2020 she was evaluated at [facility] due to a recurrent syncopal spell which occurred this Tuesday morning.  She was at work where she works as a .  She bent over to pick something up and had lightheadedness.   She had an EKG, EEG which was normal, negative perfusion stress test and wore 24-hour Holter which showed an 8-second atrial run, P AC, PVC, and 1 sinus pause which lasted 1.5 seconds.  She ultimately had an  "implantable loop recorder implanted 8/28/2020 for recurrent syncope.   She had no recurrent syncope since loop recorder.    She presents today for reassessment.  She had a syncopal episode in August.  She was at a family cookout and it was hide and she moved from one location and walked to another and while sitting down she slumped over into her cousin's lap.  She came to and was taken to the ER.  She had CT and MRI of the head which were unremarkable and was kept overnight.  Echocardiogram showed preserved LV systolic function and no significant valve disease despite mild to moderate tricuspid valve regurgitation.  She was given IV fluid and ultimately this was felt to be related to dehydration in the heat.  She has no chest pain tightness or pressure shortness of breath or palpitations.  Her last nosebleed was in December 2023.  She has occasional ankle swelling and vertigo but overall is doing well.  She has a cough intermittently.  She continues to smoke.  No plans to quit.  She walks all day long at work.  She is now following with nephrology.          Allergies   Allergen Reactions    Iodinated Contrast Media Itching and Swelling           Family and social history reviewed.     ROS  All other systems were reviewed and are negative          Objective:     Vitals:    11/21/24 1506   BP: 130/80   BP Location: Left arm   Patient Position: Sitting   Cuff Size: Adult   Pulse: 65   Weight: 71.2 kg (157 lb)   Height: 160 cm (63\")     Body mass index is 27.81 kg/m².    PHYSICAL EXAM:  Pulmonary:      Breath sounds: Examination of the right-lower field reveals decreased breath sounds. Examination of the left-lower field reveals decreased breath sounds. Decreased breath sounds present.   Cardiovascular:      Normal rate. Regular rhythm.           ECG 12 Lead    Date/Time: 11/21/2024 3:21 PM  Performed by: Madhavi Le APRN    Authorized by: Madhavi Le APRN  Comparison: compared with previous ECG   Similar to " previous ECG  Rhythm: sinus rhythm  Ectopy: atrial premature contractions  Rate: normal  T inversion: III and aVF  QRS axis: left            Current Outpatient Medications   Medication Sig Dispense Refill    aspirin 325 MG EC tablet TAKE 1 TABLET BY MOUTH DAILY FOR 90 DOSES      levothyroxine (SYNTHROID, LEVOTHROID) 125 MCG tablet Take 1 tablet by mouth Daily.      meclizine (ANTIVERT) 25 MG tablet Take 1 tablet by mouth 3 (Three) Times a Day As Needed.      potassium chloride (K-DUR) 10 MEQ CR tablet Take 1 tablet by mouth 2 (Two) Times a Day.      simvastatin (ZOCOR) 20 MG tablet TAKE 1 TABLET BY MOUTH NIGHTLY FOR 90 DAYS      valsartan-hydrochlorothiazide (DIOVAN-HCT) 160-25 MG per tablet TAKE 1 TABLET BY MOUTH EVERY DAY      doxepin (SINEquan) 25 MG capsule Take 1 capsule by mouth At Night As Needed.      fexofenadine-pseudoephedrine (ALLEGRA-D)  MG per 12 hr tablet Take as instructed by your provider      fluticasone (FLONASE) 50 MCG/ACT nasal spray Administer 1 spray into the nostril(s) as directed by provider Daily.       No current facility-administered medications for this visit.     Assessment:       Diagnosis Plan   1. Coronary artery disease involving native coronary artery of native heart without angina pectoris             No orders of the defined types were placed in this encounter.        Plan:     1. Ms. Mac  is a 73-year-old pleasant -American female with coronary artery disease, normal left ventricular systolic function.  Status post drug-eluting stent placement to the mid left anterior descending other vessels free of disease in 2006.    Perfusion stress test January 2022 negative for ischemia  -No angina  2.  Hypertension.she is maintained on Diovan/HCTZ.    3.  Hyperlipidemia on simvastatin 20 mg LDL target 70 or less.    4.  Tobacco use-again advised that she stop smoking  5.  Hypothyroidism on replacement therapy  6.  Recurrent syncope. History of positive tilt table test 2013.   Loop recorder reached RRT February 2024.  7.  History of vertigo as needed meclizine follows with ENT.  Previously responded well to Epley maneuvers  8.  Remote cerebral vascular accident and old lacunar infarct-she is maintained on full-strength aspirin and statin therapy  9.  Obstructive sleep apnea- she is not treating  10.  Chronic kidney disease-follows with Dr. Luna  11.  Carotid artery stenosis  - less than 50% bilateral on duplex 09/2021 at [place]  12. s/p left knee surgery- she has completed Pt and is cleared to return to work from orthopedic standpoint   13.  Macrocytic anemia follows with Dr. Ailyn Lobo with [place] [facility].  Most recent hemoglobin 11.2    13.  Intermittent epistaxis-she was in the ER for this December 2023 and has mild episodes intermittently.  She has had these for a long time and reports ability at this time    Follow-up in 1 year in cardiology clinic.            It has been a pleasure to participate in this patient's care.      Thank you,  LILY Mace      **I used Dragon to dictate this note:**

## (undated) DEVICE — KT REVEAL LINQ TOOL